# Patient Record
Sex: MALE | Race: BLACK OR AFRICAN AMERICAN | Employment: UNEMPLOYED
[De-identification: names, ages, dates, MRNs, and addresses within clinical notes are randomized per-mention and may not be internally consistent; named-entity substitution may affect disease eponyms.]

---

## 2017-01-05 ENCOUNTER — OFFICE VISIT (OUTPATIENT)
Dept: NEUROLOGY | Facility: CLINIC | Age: 27
End: 2017-01-05

## 2017-01-05 DIAGNOSIS — G90.511 COMPLEX REGIONAL PAIN SYNDROME TYPE 1 OF RIGHT UPPER EXTREMITY: Primary | ICD-10-CM

## 2017-01-05 DIAGNOSIS — M65.4 RADIAL STYLOID TENOSYNOVITIS (DE QUERVAIN): ICD-10-CM

## 2017-01-05 PROCEDURE — 95886 MUSC TEST DONE W/N TEST COMP: CPT | Performed by: PSYCHIATRY & NEUROLOGY

## 2017-01-05 PROCEDURE — 95909 NRV CNDJ TST 5-6 STUDIES: CPT | Performed by: PSYCHIATRY & NEUROLOGY

## 2017-02-01 ENCOUNTER — OFFICE VISIT (OUTPATIENT)
Dept: ORTHOPEDIC SURGERY | Facility: CLINIC | Age: 27
End: 2017-02-01

## 2017-02-01 VITALS — WEIGHT: 283 LBS | BODY MASS INDEX: 36.32 KG/M2 | HEIGHT: 74 IN

## 2017-02-01 DIAGNOSIS — M25.531 CHRONIC PAIN OF RIGHT WRIST: ICD-10-CM

## 2017-02-01 DIAGNOSIS — G89.29 CHRONIC PAIN OF RIGHT WRIST: ICD-10-CM

## 2017-02-01 DIAGNOSIS — M19.031 ARTHRITIS OF RIGHT WRIST: ICD-10-CM

## 2017-02-01 DIAGNOSIS — M65.4 DE QUERVAIN'S TENOSYNOVITIS, RIGHT: Primary | ICD-10-CM

## 2017-02-01 PROCEDURE — 99213 OFFICE O/P EST LOW 20 MIN: CPT | Performed by: ORTHOPAEDIC SURGERY

## 2017-02-01 RX ORDER — HYDROCODONE BITARTRATE AND ACETAMINOPHEN 5; 325 MG/1; MG/1
1 TABLET ORAL EVERY 12 HOURS PRN
Qty: 30 TABLET | Refills: 0 | Status: SHIPPED | OUTPATIENT
Start: 2017-02-01 | End: 2017-09-25 | Stop reason: ALTCHOICE

## 2017-02-01 ASSESSMENT — ENCOUNTER SYMPTOMS
ABDOMINAL PAIN: 0
VOMITING: 0
EYE PAIN: 0
SHORTNESS OF BREATH: 0
NAUSEA: 0
ABDOMINAL DISTENTION: 0
COUGH: 0
WHEEZING: 0
COLOR CHANGE: 0

## 2017-05-25 ENCOUNTER — HOSPITAL ENCOUNTER (EMERGENCY)
Age: 27
Discharge: HOME | End: 2017-05-25
Attending: EMERGENCY MEDICINE
Payer: MEDICARE

## 2017-05-25 VITALS
TEMPERATURE: 98.3 F | BODY MASS INDEX: 36.45 KG/M2 | HEART RATE: 60 BPM | OXYGEN SATURATION: 99 % | HEIGHT: 73 IN | RESPIRATION RATE: 16 BRPM | DIASTOLIC BLOOD PRESSURE: 84 MMHG | WEIGHT: 275 LBS | SYSTOLIC BLOOD PRESSURE: 136 MMHG

## 2017-05-25 DIAGNOSIS — H10.9 CONJUNCTIVITIS OF BOTH EYES, UNSPECIFIED CONJUNCTIVITIS TYPE: Primary | ICD-10-CM

## 2017-05-25 PROCEDURE — 6370000000 HC RX 637 (ALT 250 FOR IP): Performed by: EMERGENCY MEDICINE

## 2017-05-25 PROCEDURE — 99282 EMERGENCY DEPT VISIT SF MDM: CPT

## 2017-05-25 RX ORDER — PROPARACAINE HYDROCHLORIDE 5 MG/ML
1 SOLUTION/ DROPS OPHTHALMIC ONCE
Status: COMPLETED | OUTPATIENT
Start: 2017-05-25 | End: 2017-05-25

## 2017-05-25 RX ORDER — GENTAMICIN SULFATE 3 MG/ML
2 SOLUTION/ DROPS OPHTHALMIC 4 TIMES DAILY
Status: DISCONTINUED | OUTPATIENT
Start: 2017-05-25 | End: 2017-05-25 | Stop reason: HOSPADM

## 2017-05-25 RX ADMIN — FLUORESCEIN SODIUM 1 MG: 1 STRIP OPHTHALMIC at 01:18

## 2017-05-25 RX ADMIN — PROPARACAINE HYDROCHLORIDE 1 DROP: 5 SOLUTION/ DROPS OPHTHALMIC at 01:18

## 2017-05-25 RX ADMIN — GENTAMICIN SULFATE 2 DROP: 3 SOLUTION/ DROPS OPHTHALMIC at 01:30

## 2017-05-25 ASSESSMENT — ENCOUNTER SYMPTOMS
DIARRHEA: 0
VOMITING: 0
COUGH: 0
RHINORRHEA: 0
EYE REDNESS: 1
NAUSEA: 0
SHORTNESS OF BREATH: 0
COLOR CHANGE: 0
SORE THROAT: 0
EYE DISCHARGE: 0

## 2017-06-21 ENCOUNTER — TELEPHONE (OUTPATIENT)
Dept: ORTHOPEDIC SURGERY | Age: 27
End: 2017-06-21

## 2017-09-25 ENCOUNTER — HOSPITAL ENCOUNTER (EMERGENCY)
Age: 27
Discharge: HOME | End: 2017-09-26
Attending: EMERGENCY MEDICINE
Payer: MEDICARE

## 2017-09-25 DIAGNOSIS — E86.0 DEHYDRATION: Primary | ICD-10-CM

## 2017-09-25 DIAGNOSIS — R11.11 NON-INTRACTABLE VOMITING WITHOUT NAUSEA, UNSPECIFIED VOMITING TYPE: ICD-10-CM

## 2017-09-25 PROCEDURE — 99284 EMERGENCY DEPT VISIT MOD MDM: CPT

## 2017-09-25 RX ORDER — LORAZEPAM 1 MG/1
1 TABLET ORAL EVERY 6 HOURS PRN
Qty: 4 TABLET | Refills: 0 | Status: SHIPPED | OUTPATIENT
Start: 2017-09-25 | End: 2017-09-25 | Stop reason: CLARIF

## 2017-09-25 RX ORDER — ONDANSETRON 2 MG/ML
4 INJECTION INTRAMUSCULAR; INTRAVENOUS ONCE
Status: COMPLETED | OUTPATIENT
Start: 2017-09-26 | End: 2017-09-26

## 2017-09-25 RX ORDER — 0.9 % SODIUM CHLORIDE 0.9 %
2000 INTRAVENOUS SOLUTION INTRAVENOUS ONCE
Status: COMPLETED | OUTPATIENT
Start: 2017-09-26 | End: 2017-09-26

## 2017-09-25 ASSESSMENT — PAIN DESCRIPTION - DESCRIPTORS: DESCRIPTORS: SHARP

## 2017-09-25 ASSESSMENT — PAIN DESCRIPTION - LOCATION: LOCATION: HEAD

## 2017-09-25 ASSESSMENT — PAIN DESCRIPTION - PAIN TYPE: TYPE: ACUTE PAIN

## 2017-09-25 ASSESSMENT — PAIN SCALES - GENERAL: PAINLEVEL_OUTOF10: 8

## 2017-09-25 ASSESSMENT — PAIN DESCRIPTION - ORIENTATION: ORIENTATION: POSTERIOR

## 2017-09-25 ASSESSMENT — PAIN DESCRIPTION - ONSET: ONSET: PROGRESSIVE

## 2017-09-26 VITALS
SYSTOLIC BLOOD PRESSURE: 117 MMHG | RESPIRATION RATE: 16 BRPM | HEART RATE: 73 BPM | TEMPERATURE: 97.5 F | WEIGHT: 270 LBS | DIASTOLIC BLOOD PRESSURE: 85 MMHG | HEIGHT: 73 IN | BODY MASS INDEX: 35.78 KG/M2 | OXYGEN SATURATION: 98 %

## 2017-09-26 LAB
ABSOLUTE EOS #: 0 K/UL (ref 0–0.4)
ABSOLUTE LYMPH #: 1 K/UL (ref 1–4.8)
ABSOLUTE MONO #: 0.5 K/UL (ref 0.1–1.3)
ALBUMIN SERPL-MCNC: 4.6 G/DL (ref 3.5–5.2)
ALBUMIN/GLOBULIN RATIO: NORMAL (ref 1–2.5)
ALP BLD-CCNC: 71 U/L (ref 40–129)
ALT SERPL-CCNC: 17 U/L (ref 5–41)
ANION GAP SERPL CALCULATED.3IONS-SCNC: 15 MMOL/L (ref 9–17)
AST SERPL-CCNC: 21 U/L
BASOPHILS # BLD: 0 %
BASOPHILS ABSOLUTE: 0 K/UL (ref 0–0.2)
BILIRUB SERPL-MCNC: 0.48 MG/DL (ref 0.3–1.2)
BILIRUBIN DIRECT: 0.15 MG/DL
BILIRUBIN, INDIRECT: 0.33 MG/DL (ref 0–1)
BUN BLDV-MCNC: 13 MG/DL (ref 6–20)
BUN/CREAT BLD: ABNORMAL (ref 9–20)
CALCIUM SERPL-MCNC: 9.6 MG/DL (ref 8.6–10.4)
CHLORIDE BLD-SCNC: 101 MMOL/L (ref 98–107)
CO2: 27 MMOL/L (ref 20–31)
CREAT SERPL-MCNC: 0.98 MG/DL (ref 0.7–1.2)
DIFFERENTIAL TYPE: ABNORMAL
EOSINOPHILS RELATIVE PERCENT: 0 %
GFR AFRICAN AMERICAN: >60 ML/MIN
GFR NON-AFRICAN AMERICAN: >60 ML/MIN
GFR SERPL CREATININE-BSD FRML MDRD: ABNORMAL ML/MIN/{1.73_M2}
GFR SERPL CREATININE-BSD FRML MDRD: ABNORMAL ML/MIN/{1.73_M2}
GLOBULIN: NORMAL G/DL (ref 1.5–3.8)
GLUCOSE BLD-MCNC: 133 MG/DL (ref 70–99)
HCT VFR BLD CALC: 46.2 % (ref 41–53)
HEMOGLOBIN: 15.7 G/DL (ref 13.5–17.5)
LIPASE: 15 U/L (ref 13–60)
LYMPHOCYTES # BLD: 9 %
MAGNESIUM: 2.2 MG/DL (ref 1.6–2.6)
MCH RBC QN AUTO: 30.6 PG (ref 26–34)
MCHC RBC AUTO-ENTMCNC: 33.9 G/DL (ref 31–37)
MCV RBC AUTO: 90.3 FL (ref 80–100)
MONOCYTES # BLD: 5 %
PDW BLD-RTO: 13.4 % (ref 11.5–14.9)
PLATELET # BLD: 224 K/UL (ref 150–450)
PLATELET ESTIMATE: ABNORMAL
PMV BLD AUTO: 9.2 FL (ref 6–12)
POTASSIUM SERPL-SCNC: 4.2 MMOL/L (ref 3.7–5.3)
RBC # BLD: 5.11 M/UL (ref 4.5–5.9)
RBC # BLD: ABNORMAL 10*6/UL
SEG NEUTROPHILS: 86 %
SEGMENTED NEUTROPHILS ABSOLUTE COUNT: 10 K/UL (ref 1.3–9.1)
SODIUM BLD-SCNC: 143 MMOL/L (ref 135–144)
TOTAL PROTEIN: 7.9 G/DL (ref 6.4–8.3)
WBC # BLD: 11.6 K/UL (ref 3.5–11)
WBC # BLD: ABNORMAL 10*3/UL

## 2017-09-26 PROCEDURE — 80076 HEPATIC FUNCTION PANEL: CPT

## 2017-09-26 PROCEDURE — 83735 ASSAY OF MAGNESIUM: CPT

## 2017-09-26 PROCEDURE — 96374 THER/PROPH/DIAG INJ IV PUSH: CPT

## 2017-09-26 PROCEDURE — 85025 COMPLETE CBC W/AUTO DIFF WBC: CPT

## 2017-09-26 PROCEDURE — 93005 ELECTROCARDIOGRAM TRACING: CPT

## 2017-09-26 PROCEDURE — 2580000003 HC RX 258: Performed by: EMERGENCY MEDICINE

## 2017-09-26 PROCEDURE — 96361 HYDRATE IV INFUSION ADD-ON: CPT

## 2017-09-26 PROCEDURE — 36415 COLL VENOUS BLD VENIPUNCTURE: CPT

## 2017-09-26 PROCEDURE — 83690 ASSAY OF LIPASE: CPT

## 2017-09-26 PROCEDURE — 6360000002 HC RX W HCPCS: Performed by: EMERGENCY MEDICINE

## 2017-09-26 PROCEDURE — 80048 BASIC METABOLIC PNL TOTAL CA: CPT

## 2017-09-26 RX ADMIN — ONDANSETRON 4 MG: 2 INJECTION INTRAMUSCULAR; INTRAVENOUS at 00:07

## 2017-09-26 RX ADMIN — SODIUM CHLORIDE 2000 ML: 9 INJECTION, SOLUTION INTRAVENOUS at 00:07

## 2017-09-26 ASSESSMENT — ENCOUNTER SYMPTOMS
VOMITING: 1
NAUSEA: 1
ABDOMINAL PAIN: 1
SHORTNESS OF BREATH: 0
CHEST TIGHTNESS: 0
BACK PAIN: 0

## 2017-09-29 LAB
EKG ATRIAL RATE: 72 BPM
EKG P AXIS: 67 DEGREES
EKG P-R INTERVAL: 172 MS
EKG Q-T INTERVAL: 380 MS
EKG QRS DURATION: 98 MS
EKG QTC CALCULATION (BAZETT): 416 MS
EKG R AXIS: 73 DEGREES
EKG T AXIS: 33 DEGREES
EKG VENTRICULAR RATE: 72 BPM

## 2017-12-05 ENCOUNTER — OFFICE VISIT (OUTPATIENT)
Dept: INTERNAL MEDICINE | Age: 27
End: 2017-12-05
Payer: MEDICARE

## 2017-12-05 VITALS
HEART RATE: 74 BPM | HEIGHT: 74 IN | SYSTOLIC BLOOD PRESSURE: 146 MMHG | WEIGHT: 297 LBS | BODY MASS INDEX: 38.12 KG/M2 | DIASTOLIC BLOOD PRESSURE: 86 MMHG

## 2017-12-05 DIAGNOSIS — M25.531 RIGHT WRIST PAIN: Primary | ICD-10-CM

## 2017-12-05 DIAGNOSIS — Z23 NEED FOR VACCINATION FOR PNEUMOCOCCUS: ICD-10-CM

## 2017-12-05 DIAGNOSIS — Z23 NEEDS FLU SHOT: ICD-10-CM

## 2017-12-05 DIAGNOSIS — F17.200 SMOKER: ICD-10-CM

## 2017-12-05 DIAGNOSIS — Z23 NEED FOR TDAP VACCINATION: ICD-10-CM

## 2017-12-05 DIAGNOSIS — Z11.4 SCREENING FOR HIV (HUMAN IMMUNODEFICIENCY VIRUS): ICD-10-CM

## 2017-12-05 PROCEDURE — 90688 IIV4 VACCINE SPLT 0.5 ML IM: CPT | Performed by: INTERNAL MEDICINE

## 2017-12-05 PROCEDURE — G8417 CALC BMI ABV UP PARAM F/U: HCPCS | Performed by: INTERNAL MEDICINE

## 2017-12-05 PROCEDURE — 90471 IMMUNIZATION ADMIN: CPT | Performed by: INTERNAL MEDICINE

## 2017-12-05 PROCEDURE — 4004F PT TOBACCO SCREEN RCVD TLK: CPT | Performed by: INTERNAL MEDICINE

## 2017-12-05 PROCEDURE — 90732 PPSV23 VACC 2 YRS+ SUBQ/IM: CPT | Performed by: INTERNAL MEDICINE

## 2017-12-05 PROCEDURE — 99203 OFFICE O/P NEW LOW 30 MIN: CPT | Performed by: INTERNAL MEDICINE

## 2017-12-05 PROCEDURE — G8427 DOCREV CUR MEDS BY ELIG CLIN: HCPCS | Performed by: INTERNAL MEDICINE

## 2017-12-05 PROCEDURE — G8484 FLU IMMUNIZE NO ADMIN: HCPCS | Performed by: INTERNAL MEDICINE

## 2017-12-05 PROCEDURE — 90715 TDAP VACCINE 7 YRS/> IM: CPT | Performed by: INTERNAL MEDICINE

## 2017-12-05 PROCEDURE — 90472 IMMUNIZATION ADMIN EACH ADD: CPT | Performed by: INTERNAL MEDICINE

## 2017-12-05 RX ORDER — GABAPENTIN 100 MG/1
100 CAPSULE ORAL DAILY
Qty: 90 CAPSULE | Refills: 3 | Status: SHIPPED | OUTPATIENT
Start: 2017-12-05 | End: 2018-03-15

## 2017-12-05 RX ORDER — IBUPROFEN 800 MG/1
800 TABLET ORAL EVERY 8 HOURS PRN
Qty: 60 TABLET | Refills: 2 | Status: SHIPPED | OUTPATIENT
Start: 2017-12-05 | End: 2018-01-01

## 2017-12-05 ASSESSMENT — PATIENT HEALTH QUESTIONNAIRE - PHQ9
SUM OF ALL RESPONSES TO PHQ QUESTIONS 1-9: 6
8. MOVING OR SPEAKING SO SLOWLY THAT OTHER PEOPLE COULD HAVE NOTICED. OR THE OPPOSITE, BEING SO FIGETY OR RESTLESS THAT YOU HAVE BEEN MOVING AROUND A LOT MORE THAN USUAL: 0
SUM OF ALL RESPONSES TO PHQ9 QUESTIONS 1 & 2: 2
2. FEELING DOWN, DEPRESSED OR HOPELESS: 0
5. POOR APPETITE OR OVEREATING: 3
7. TROUBLE CONCENTRATING ON THINGS, SUCH AS READING THE NEWSPAPER OR WATCHING TELEVISION: 0
1. LITTLE INTEREST OR PLEASURE IN DOING THINGS: 2
3. TROUBLE FALLING OR STAYING ASLEEP: 1
9. THOUGHTS THAT YOU WOULD BE BETTER OFF DEAD, OR OF HURTING YOURSELF: 0
10. IF YOU CHECKED OFF ANY PROBLEMS, HOW DIFFICULT HAVE THESE PROBLEMS MADE IT FOR YOU TO DO YOUR WORK, TAKE CARE OF THINGS AT HOME, OR GET ALONG WITH OTHER PEOPLE: 1
4. FEELING TIRED OR HAVING LITTLE ENERGY: 0
6. FEELING BAD ABOUT YOURSELF - OR THAT YOU ARE A FAILURE OR HAVE LET YOURSELF OR YOUR FAMILY DOWN: 0

## 2017-12-05 NOTE — PROGRESS NOTES
pneumococcus    4. Need for Tdap vaccination    5. Screening for HIV (human immunodeficiency virus)    6. Smoker         Plan:   1. Will prescribe Ibuprofen PRN, topical volateren. Also start gabapentin  Continue using splint  Will refer for Physical therapy  Will obtain office notes from orthopedics at [facility]  2. Flu shot  3. Pneumovax  4. TDAP  5. HIV antibody  6. Counseling done for smoking cessation-he is not ready to quit yet  7. Return in about 2 months (around 2/5/2018). '

## 2017-12-12 ENCOUNTER — TELEPHONE (OUTPATIENT)
Dept: INTERNAL MEDICINE | Age: 27
End: 2017-12-12

## 2017-12-12 DIAGNOSIS — M25.531 RIGHT WRIST PAIN: Primary | ICD-10-CM

## 2017-12-12 NOTE — TELEPHONE ENCOUNTER
Occupational Therapist -----Needs order changed from PT to OT for Right wrist if appropriate  She will get the orders from [facility] system once signed-- pended in EPIC

## 2018-01-01 ENCOUNTER — APPOINTMENT (OUTPATIENT)
Dept: GENERAL RADIOLOGY | Age: 28
End: 2018-01-01
Payer: MEDICARE

## 2018-01-01 ENCOUNTER — HOSPITAL ENCOUNTER (EMERGENCY)
Age: 28
Discharge: HOME | End: 2018-01-01
Attending: EMERGENCY MEDICINE
Payer: MEDICARE

## 2018-01-01 VITALS
OXYGEN SATURATION: 98 % | HEART RATE: 72 BPM | TEMPERATURE: 98.2 F | RESPIRATION RATE: 18 BRPM | SYSTOLIC BLOOD PRESSURE: 142 MMHG | HEIGHT: 73 IN | WEIGHT: 297 LBS | DIASTOLIC BLOOD PRESSURE: 76 MMHG | BODY MASS INDEX: 39.36 KG/M2

## 2018-01-01 DIAGNOSIS — S60.221A CONTUSION OF RIGHT HAND, INITIAL ENCOUNTER: Primary | ICD-10-CM

## 2018-01-01 PROCEDURE — 6370000000 HC RX 637 (ALT 250 FOR IP): Performed by: STUDENT IN AN ORGANIZED HEALTH CARE EDUCATION/TRAINING PROGRAM

## 2018-01-01 PROCEDURE — 73130 X-RAY EXAM OF HAND: CPT

## 2018-01-01 PROCEDURE — 99283 EMERGENCY DEPT VISIT LOW MDM: CPT

## 2018-01-01 RX ORDER — IBUPROFEN 400 MG/1
400 TABLET ORAL EVERY 6 HOURS PRN
Qty: 15 TABLET | Refills: 0 | Status: SHIPPED | OUTPATIENT
Start: 2018-01-01 | End: 2022-10-11

## 2018-01-01 RX ORDER — IBUPROFEN 800 MG/1
800 TABLET ORAL ONCE
Status: COMPLETED | OUTPATIENT
Start: 2018-01-01 | End: 2018-01-01

## 2018-01-01 RX ADMIN — IBUPROFEN 800 MG: 800 TABLET, FILM COATED ORAL at 15:41

## 2018-01-01 ASSESSMENT — PAIN SCALES - GENERAL
PAINLEVEL_OUTOF10: 10
PAINLEVEL_OUTOF10: 10

## 2018-01-01 ASSESSMENT — ENCOUNTER SYMPTOMS
PHOTOPHOBIA: 0
NAUSEA: 0
COUGH: 0
VOMITING: 0
SORE THROAT: 0
CHEST TIGHTNESS: 0
SHORTNESS OF BREATH: 0
BACK PAIN: 0
TROUBLE SWALLOWING: 0
ABDOMINAL PAIN: 0
WHEEZING: 0

## 2018-01-01 ASSESSMENT — PAIN DESCRIPTION - PAIN TYPE: TYPE: ACUTE PAIN

## 2018-01-01 ASSESSMENT — PAIN DESCRIPTION - LOCATION: LOCATION: HAND

## 2018-01-01 ASSESSMENT — PAIN DESCRIPTION - DESCRIPTORS: DESCRIPTORS: ACHING

## 2018-01-01 ASSESSMENT — PAIN DESCRIPTION - ORIENTATION: ORIENTATION: RIGHT

## 2018-01-01 ASSESSMENT — PAIN DESCRIPTION - FREQUENCY: FREQUENCY: CONTINUOUS

## 2018-01-01 NOTE — ED NOTES
Pt back from [address] [place] [address] X 600 [facility], RN  01/01/18 3344

## 2018-01-01 NOTE — ED NOTES
Temporary report received from [facility] [address] [place] [address].       Hermelinda Serrato RN  01/01/18 2508

## 2018-01-01 NOTE — ED PROVIDER NOTES
[facility]  ED  Emergency Department Encounter  Emergency Medicine Resident     Pt Name: Dhaval Lion  MRN: [id number]  [place] 1990  Date of evaluation: 1/1/18  PCP:  Sanjay Gayle MD    [address] [place] [address]       Chief Complaint   Patient presents with    Hand Injury       HISTORY OF PRESENT ILLNESS  (Location/Symptom, Timing/Onset, Context/Setting, Quality, Duration, Modifying Factors, Severity.)      Dhaval Whitneyr is a 32 y.o. male who presents with Right hand injury. Patient states that he was shoveling snow yesterday around noon and fell out onto his hand. Patient complaining of fourth and fifth digit pain. He is also spirits difficulty with putting on clothes due to the pain. He has no obvious deformity to the area. He denies any open wounds or bleeding to the area. Patient is able to use his hand but the range of motion is limited due to pain. He has a previous injury and multiple surgeries to his fourth and fifth digit and is worried that he might have fractured his hand again. His tetanus shot is within 10 years. Dominant hand his right hand. PAST MEDICAL / SURGICAL / SOCIAL / FAMILY HISTORY      has a past medical history of Dental bridge present; Difficult intravenous access; Hand fracture, right; PONV (postoperative nausea and vomiting); and Wears glasses. has a past surgical history that includes fracture surgery (Right, 06/30/2015); orthopedic surgery (Right, 12/3/15); and Wrist surgery (Right, 09/27/2016). Social History     Social History    Marital status: Single     Spouse name: N/A    Number of children: N/A    Years of education: N/A     Occupational History    Not on file.      Social History Main Topics    Smoking status: Current Every Day Smoker     Packs/day: 2.00     Years: 3.00     Types: Cigarettes    Smokeless tobacco: Never Used    Alcohol use No      Comment: LAST USE 01/2016    Drug use: Yes     Types: Marijuana      Comment: LAST USE FIRST WEEK OF [place]    Sexual activity: Not on file     Other Topics Concern    Not on file     Social History Narrative    No narrative on file       Family History   Problem Relation Age of Onset    Other Father      SICKLE CELL AMENIA    Asthma Brother     Mental Illness Paternal Grandmother      SCHIZOPHRENIA       Allergies:  Review of patient's allergies indicates no known allergies. Home Medications:  Prior to Admission medications    Medication Sig Start Date End Date Taking? Authorizing Provider   ibuprofen (ADVIL;MOTRIN) 400 MG tablet Take 1 tablet by mouth every 6 hours as needed for Pain 1/1/18  Yes Mariam Johns MD   gabapentin (NEURONTIN) 100 MG capsule Take 1 capsule by mouth daily 12/5/17   Apple Neri MD   diclofenac sodium 1 % GEL Apply 2 g topically 2 times daily 12/5/17   Apple Neri MD       REVIEW OF SYSTEMS    (2-9 systems for level 4, 10 or more for level 5)      Review of Systems   Constitutional: Negative for chills and fever. HENT: Negative for sore throat and trouble swallowing. Eyes: Negative for photophobia. Respiratory: Negative for cough, chest tightness, shortness of breath and wheezing. Cardiovascular: Negative for chest pain and palpitations. Gastrointestinal: Negative for abdominal pain, nausea and vomiting. Endocrine: Negative for polyuria. Genitourinary: Negative for dysuria and flank pain. Musculoskeletal: Negative for back pain and neck pain. Right hand pain, swelling   Skin: Negative for rash and wound. Neurological: Negative for syncope, weakness, light-headedness and headaches. Psychiatric/Behavioral: Negative for agitation and confusion.        PHYSICAL EXAM   (up to 7 for level 4, 8 or more for level 5)      INITIAL VITALS:   BP (!) 142/76   Pulse 72   Temp 98.2 °F (36.8 °C) (Oral)   Resp 18   Ht 6' 1\" (1.854 m)   Wt 297 lb (134.7 kg)   SpO2 98%   BMI 39.18 kg/m²     Physical Exam   Constitutional: He

## 2018-02-01 ENCOUNTER — HOSPITAL ENCOUNTER (EMERGENCY)
Age: 28
Discharge: HOME | End: 2018-02-01
Attending: EMERGENCY MEDICINE
Payer: MEDICARE

## 2018-02-01 VITALS
DIASTOLIC BLOOD PRESSURE: 93 MMHG | SYSTOLIC BLOOD PRESSURE: 153 MMHG | TEMPERATURE: 97.7 F | RESPIRATION RATE: 16 BRPM | HEART RATE: 90 BPM | OXYGEN SATURATION: 97 %

## 2018-02-01 DIAGNOSIS — J02.9 ACUTE PHARYNGITIS, UNSPECIFIED ETIOLOGY: Primary | ICD-10-CM

## 2018-02-01 LAB — MONONUCLEOSIS SCREEN: NEGATIVE

## 2018-02-01 PROCEDURE — 6360000002 HC RX W HCPCS: Performed by: EMERGENCY MEDICINE

## 2018-02-01 PROCEDURE — 6370000000 HC RX 637 (ALT 250 FOR IP): Performed by: EMERGENCY MEDICINE

## 2018-02-01 PROCEDURE — 86308 HETEROPHILE ANTIBODY SCREEN: CPT

## 2018-02-01 PROCEDURE — 99282 EMERGENCY DEPT VISIT SF MDM: CPT

## 2018-02-01 RX ORDER — DEXAMETHASONE SODIUM PHOSPHATE 10 MG/ML
10 INJECTION INTRAMUSCULAR; INTRAVENOUS ONCE
Status: COMPLETED | OUTPATIENT
Start: 2018-02-01 | End: 2018-02-01

## 2018-02-01 RX ORDER — AMOXICILLIN AND CLAVULANATE POTASSIUM 875; 125 MG/1; MG/1
1 TABLET, FILM COATED ORAL ONCE
Status: COMPLETED | OUTPATIENT
Start: 2018-02-01 | End: 2018-02-01

## 2018-02-01 RX ORDER — AMOXICILLIN 250 MG/1
500 CAPSULE ORAL ONCE
Status: DISCONTINUED | OUTPATIENT
Start: 2018-02-01 | End: 2018-02-01

## 2018-02-01 RX ORDER — DEXAMETHASONE 4 MG/1
8 TABLET ORAL ONCE
Qty: 2 TABLET | Refills: 0 | Status: SHIPPED | OUTPATIENT
Start: 2018-02-01 | End: 2018-02-01

## 2018-02-01 RX ORDER — AMOXICILLIN AND CLAVULANATE POTASSIUM 875; 125 MG/1; MG/1
1 TABLET, FILM COATED ORAL 2 TIMES DAILY
Qty: 20 TABLET | Refills: 0 | Status: SHIPPED | OUTPATIENT
Start: 2018-02-01 | End: 2018-02-11

## 2018-02-01 RX ADMIN — AMOXICILLIN AND CLAVULANATE POTASSIUM 1 TABLET: 875; 125 TABLET, FILM COATED ORAL at 11:43

## 2018-02-01 RX ADMIN — DEXAMETHASONE SODIUM PHOSPHATE 10 MG: 10 INJECTION INTRAMUSCULAR; INTRAVENOUS at 11:43

## 2018-02-01 ASSESSMENT — ENCOUNTER SYMPTOMS
COUGH: 0
SORE THROAT: 1
EYE DISCHARGE: 0
NAUSEA: 0
COLOR CHANGE: 0
VOMITING: 0
EYE REDNESS: 0
SHORTNESS OF BREATH: 0
WHEEZING: 0

## 2018-02-01 ASSESSMENT — PAIN SCALES - GENERAL: PAINLEVEL_OUTOF10: 10

## 2018-02-01 ASSESSMENT — PAIN DESCRIPTION - LOCATION: LOCATION: THROAT

## 2018-02-01 ASSESSMENT — PAIN DESCRIPTION - PAIN TYPE: TYPE: ACUTE PAIN

## 2018-02-01 ASSESSMENT — PAIN DESCRIPTION - DESCRIPTORS: DESCRIPTORS: BURNING

## 2018-02-01 NOTE — ED PROVIDER NOTES
[address] [facility]     Emergency Department     Faculty Attestation    I performed a history and physical examination of the patient and discussed management with the resident. I reviewed the residents note and agree with the documented findings and plan of care. Any areas of disagreement are noted on the chart. I was personally present for the key portions of any procedures. I have documented in the chart those procedures where I was not present during the key portions. I have reviewed the emergency nurses triage note. I agree with the chief complaint, past medical history, past surgical history, allergies, medications, social and family history as documented unless otherwise noted below. For Physician Assistant/ Nurse Practitioner cases/documentation I have personally evaluated this patient and have completed at least one if not all key elements of the E/M (history, physical exam, and MDM). Additional findings are as noted.       Primary Care Physician:  Sanjay Gayle MD    CHIEF COMPLAINT       Chief Complaint   Patient presents with    Pharyngitis       RECENT VITALS:   Temp: 97.7 °F (36.5 °C),  Pulse: 90, Resp: 16, BP: (!) 153/93    LABS:  Labs Reviewed - No data to display      PERTINENT ATTENDING PHYSICIAN COMMENTS:    Patient here with a sore throat had weak symptoms of cold like symptoms and then developed sore throat on exam his tonsils are touching his uvula but they're symmetric he does have exudates does have a lot of tenderness anteriorly and some posterior lymphadenopathy     Critical Care          Barry Mcdaniel MD, [facility]  Attending Emergency  Physician              Barry Mcdaniel MD  02/01/18 6932

## 2018-02-01 NOTE — ED PROVIDER NOTES
[facility]  ED  Emergency Department Encounter  Emergency Medicine Resident     Pt Name: iKrk Grossman  MRN: [id number]  [place] 1990  Date of evaluation: 2/1/18  PCP:  Mariann Minaya MD    [address] [place] [address]       Chief Complaint   Patient presents with    Pharyngitis       HISTORY OF PRESENT ILLNESS  (Location/Symptom, Timing/Onset, Context/Setting, Quality, Duration, Modifying Factors, Severity.)      Kirk Grossman is a 32 y.o. male who presents Complaining of sore throat. Complains of flulike symptoms for about a week but sore throat worsening over the last 2 days. No rashes, chest pain, drooling, shortness of breath, cough. Reports fever and chills which resolved    PAST MEDICAL / SURGICAL / SOCIAL / FAMILY HISTORY      has a past medical history of Dental bridge present; Difficult intravenous access; Hand fracture, right; PONV (postoperative nausea and vomiting); and Wears glasses. has a past surgical history that includes fracture surgery (Right, 06/30/2015); orthopedic surgery (Right, 12/3/15); and Wrist surgery (Right, 09/27/2016). Social History     Social History    Marital status: Single     Spouse name: N/A    Number of children: N/A    Years of education: N/A     Occupational History    Not on file.      Social History Main Topics    Smoking status: Current Every Day Smoker     Packs/day: 2.00     Years: 3.00     Types: Cigarettes    Smokeless tobacco: Never Used    Alcohol use No      Comment: LAST USE 01/2016    Drug use: Yes     Types: Marijuana      Comment: LAST USE FIRST WEEK OF AUGUST    Sexual activity: Not on file     Other Topics Concern    Not on file     Social History Narrative    No narrative on file         Family History   Problem Relation Age of Onset    Other Father      SICKLE CELL AMENIA    Asthma Brother     Mental Illness Paternal Grandmother      SCHIZOPHRENIA         Allergies:  Review of patient's allergies indicates no rule out    DIAGNOSTIC RESULTS / EMERGENCY DEPARTMENT COURSE / MDM     LABS:  Labs Reviewed   MONONUCLEOSIS SCREEN     If the patient was admitted, some of the above labs may have been ordered by the admitting team(s) and were auto populated above when I refreshed my note prior to signing it. If there is any question, please check for the responsible provider for individual orders in the EHR system. Additionally, some of the above labs results may still be pending. RADIOLOGY:  All forms of imaging other than ED bedside ultrasounds are viewed and interpreted by a radiologist and their interpretations are displayed below. None     EMERGENCY DEPARTMENT COURSE AND MEDICAL DECISION MAKING:  ED Course      63-year-old male with significant edema, exudate, mono screen negative. Coverage for streptococcal pharyngitis and we'll give Decadron now and write prescription for another dose of 24-48 hrs. if his symptoms persist.  I encouraged him to return back if he should develop drooling, inability to swallow and handle his secretions, shortness of breath or difficulty breathing. I encouraged him to take his medications as prescribed and to follow-up with his primary care physician at the next available appointment and to return back here if he has any of the artery discussed symptoms or if his current symptoms worsen or he has any further concerns. He voices understanding and is agreeable with the current treatment plan    PROCEDURES:  None     CONSULTS:  None  If the patient was admitted, some of the above consults may have been placed by the admitting team(s) and were auto populated above when I refreshed my note prior to signing it. If there is any question, please check for the responsible provider for individual orders in the EHR system. CRITICAL CARE:  None     FINAL IMPRESSION      1.  Acute pharyngitis, unspecified etiology             DISPOSITION / PLAN     DISPOSITION Decision To Discharge 02/01/2018 12:11:09 PM       If discharged, the patient was instructed to return to the emergency department with any worsening symptoms, if new symptoms arise, or if they have any other concerns. Patient was instructed not to drive home if discharged today and received pain medications or other mind-altering medications while here. Pre-hypertension/Hypertension: In the case that there was an elevated blood pressure reading for this patient today in the emergency department, the patient was informed that he or she may have pre-hypertension or hypertension. It was recommended to the patient to call the primary care provider listed in the discharge instructions or a physician of the patient's choice this week to arrange follow up for further evaluation of possible pre-hypertension or hypertension. PATIENT REFERRED TO:  Gisel Balderas MD  [address] [place] [address] [address] [phone]    Schedule an appointment as soon as possible for a visit         DISCHARGE MEDICATIONS:  Discharge Medication List as of 2/1/2018 12:12 PM      START taking these medications    Details   amoxicillin-clavulanate (AUGMENTIN) 875-125 MG per tablet Take 1 tablet by mouth 2 times daily for 10 days, Disp-20 tablet, R-0Print      dexamethasone (DECADRON) 4 MG tablet Take 2 tablets by mouth once for 1 dose Take in 24-48hrs if needed for throat swelling, Disp-2 tablet, R-0Print             Daniel PINEDA   Emergency Medicine Resident Physician    (Please note that portions of this note were completed with a voice recognition program.  Efforts were made to edit the dictations but occasionally words are mis-transcribed.)     Daniel Raymundo DO  Resident  02/01/18 7303

## 2018-02-13 ENCOUNTER — OFFICE VISIT (OUTPATIENT)
Dept: INTERNAL MEDICINE | Age: 28
End: 2018-02-13
Payer: MEDICARE

## 2018-02-13 VITALS
BODY MASS INDEX: 39.01 KG/M2 | SYSTOLIC BLOOD PRESSURE: 144 MMHG | WEIGHT: 295.7 LBS | HEART RATE: 88 BPM | DIASTOLIC BLOOD PRESSURE: 102 MMHG

## 2018-02-13 DIAGNOSIS — G89.29 CHRONIC PAIN OF RIGHT WRIST: Primary | ICD-10-CM

## 2018-02-13 DIAGNOSIS — M25.531 CHRONIC PAIN OF RIGHT WRIST: Primary | ICD-10-CM

## 2018-02-13 PROCEDURE — G8484 FLU IMMUNIZE NO ADMIN: HCPCS | Performed by: INTERNAL MEDICINE

## 2018-02-13 PROCEDURE — G8427 DOCREV CUR MEDS BY ELIG CLIN: HCPCS | Performed by: INTERNAL MEDICINE

## 2018-02-13 PROCEDURE — 99213 OFFICE O/P EST LOW 20 MIN: CPT | Performed by: INTERNAL MEDICINE

## 2018-02-13 PROCEDURE — G8417 CALC BMI ABV UP PARAM F/U: HCPCS | Performed by: INTERNAL MEDICINE

## 2018-02-13 PROCEDURE — 4004F PT TOBACCO SCREEN RCVD TLK: CPT | Performed by: INTERNAL MEDICINE

## 2018-02-13 RX ORDER — DEXAMETHASONE 4 MG/1
TABLET ORAL
Refills: 0 | COMMUNITY
Start: 2018-02-03 | End: 2019-04-30 | Stop reason: ALTCHOICE

## 2018-02-13 RX ORDER — PREGABALIN 25 MG/1
25 CAPSULE ORAL 2 TIMES DAILY
Qty: 60 CAPSULE | Refills: 3 | Status: SHIPPED | OUTPATIENT
Start: 2018-02-13 | End: 2018-03-15

## 2018-02-14 NOTE — PROGRESS NOTES
has been ordered in the past  3. Counseling done for smoking cessation-he is not ready to quit yet  4. Return in about 3 months (around 5/13/2018). '

## 2018-02-15 ENCOUNTER — TELEPHONE (OUTPATIENT)
Dept: INTERNAL MEDICINE | Age: 28
End: 2018-02-15

## 2018-02-15 NOTE — TELEPHONE ENCOUNTER
PA request for Lyrica.  PA started to insurance, additional information needed but questions have yet to populate, waiting to finish PA and submit

## 2018-02-16 ENCOUNTER — TELEPHONE (OUTPATIENT)
Dept: INTERNAL MEDICINE | Age: 28
End: 2018-02-16

## 2018-02-16 NOTE — TELEPHONE ENCOUNTER
PC from pt stating he was seen on 2/13/18 and was given a script for Lyrica. Pt states that his insurance will not cover it at all, not even with a prior auth. Pt states that it will cost him $400.00 and he can't afford it. Pt wants to know if there is something else he can in place of it.       Health Maintenance   Topic Date Due    HIV screen  12/26/2005    DTaP/Tdap/Td vaccine (2 - Td) 12/05/2027    Flu vaccine  Completed    Pneumococcal med risk  Completed             (applicable per patient's age: Cancer Screenings, Depression Screening, Fall Risk Screening, Immunizations)    AST (U/L)   Date Value   09/26/2017 21     ALT (U/L)   Date Value   09/26/2017 17     BUN (mg/dL)   Date Value   09/26/2017 13      (goal A1C is < 7)   (goal LDL is <100) need 30-50% reduction from baseline     BP Readings from Last 3 Encounters:   02/13/18 (!) 144/102   02/01/18 (!) 153/93   01/01/18 (!) 142/76    (goal /80)      All Future Testing planned in [facility]:  Lab Frequency Next Occurrence   HIV Screen Once 03/05/2018   OT eval and treat Once 01/13/2018       Next Visit Date:  Future Appointments  Date Time Provider Jordan Mirza   5/21/2018 8:30 AM Paulino Eden MD [facility]            Patient Active Problem List:     Fracture of metacarpal bone     Radial styloid tenosynovitis of right hand     Radial styloid tenosynovitis     Complex regional pain syndrome type 1 of right upper extremity     De Quervain's tenosynovitis, right     Chronic pain of right wrist

## 2018-03-13 ENCOUNTER — TELEPHONE (OUTPATIENT)
Dept: INTERNAL MEDICINE | Age: 28
End: 2018-03-13

## 2018-03-13 NOTE — LETTER
[address]  [address] [facility] [address] [facility]  Phone: [phone]  Fax: [phone]    Kamilah Bay MD        March 13, 2018    [address] [id number] [facility] [address]      Dear Vivienne Craven: We are sending this letter because your PCP ordered [facility] for you to have done at your last visit here and they have not yet been completed. If you can please come to our office on the 2nd floor to  your orders to have them compelted. If you do not have a follow-up appointment scheduled you can either contact the office to make an appointment with us or you can make one when you come in to pick-up your orders. If you have any questions or concerns, please don't hesitate to call.     Sincerely,        Kamilah Bay MD

## 2018-03-15 ENCOUNTER — HOSPITAL ENCOUNTER (OUTPATIENT)
Age: 28
Setting detail: SPECIMEN
Discharge: HOME | End: 2018-03-15
Payer: MEDICARE

## 2018-03-15 ENCOUNTER — OFFICE VISIT (OUTPATIENT)
Dept: INTERNAL MEDICINE | Age: 28
End: 2018-03-15
Payer: MEDICARE

## 2018-03-15 VITALS
SYSTOLIC BLOOD PRESSURE: 148 MMHG | WEIGHT: 313 LBS | DIASTOLIC BLOOD PRESSURE: 92 MMHG | BODY MASS INDEX: 41.48 KG/M2 | HEIGHT: 73 IN | HEART RATE: 82 BPM

## 2018-03-15 DIAGNOSIS — M25.531 CHRONIC PAIN OF RIGHT WRIST: ICD-10-CM

## 2018-03-15 DIAGNOSIS — E66.01 MORBID OBESITY WITH BMI OF 40.0-44.9, ADULT (HCC): ICD-10-CM

## 2018-03-15 DIAGNOSIS — R73.9 HYPERGLYCEMIA: ICD-10-CM

## 2018-03-15 DIAGNOSIS — R74.8 ELEVATED LIVER ENZYMES: Primary | ICD-10-CM

## 2018-03-15 DIAGNOSIS — I10 ESSENTIAL HYPERTENSION: ICD-10-CM

## 2018-03-15 DIAGNOSIS — R74.8 ELEVATED LIVER ENZYMES: ICD-10-CM

## 2018-03-15 DIAGNOSIS — G89.29 CHRONIC PAIN OF RIGHT WRIST: ICD-10-CM

## 2018-03-15 LAB
CHOLESTEROL/HDL RATIO: 6.4
CHOLESTEROL: 206 MG/DL
ESTIMATED AVERAGE GLUCOSE: 131 MG/DL
FERRITIN: 81 UG/L (ref 30–400)
HAV IGM SER IA-ACNC: NONREACTIVE
HBA1C MFR BLD: 6.2 % (ref 4–6)
HDLC SERPL-MCNC: 32 MG/DL
HEPATITIS B CORE IGM ANTIBODY: NONREACTIVE
HEPATITIS B SURFACE ANTIGEN: NONREACTIVE
HEPATITIS C ANTIBODY: NONREACTIVE
LDL CHOLESTEROL: 127 MG/DL (ref 0–130)
TRIGL SERPL-MCNC: 233 MG/DL
VLDLC SERPL CALC-MCNC: ABNORMAL MG/DL (ref 1–30)

## 2018-03-15 PROCEDURE — G8427 DOCREV CUR MEDS BY ELIG CLIN: HCPCS | Performed by: HOSPITALIST

## 2018-03-15 PROCEDURE — 80074 ACUTE HEPATITIS PANEL: CPT

## 2018-03-15 PROCEDURE — 99213 OFFICE O/P EST LOW 20 MIN: CPT | Performed by: HOSPITALIST

## 2018-03-15 PROCEDURE — 4004F PT TOBACCO SCREEN RCVD TLK: CPT | Performed by: HOSPITALIST

## 2018-03-15 PROCEDURE — 82728 ASSAY OF FERRITIN: CPT

## 2018-03-15 PROCEDURE — G8417 CALC BMI ABV UP PARAM F/U: HCPCS | Performed by: HOSPITALIST

## 2018-03-15 PROCEDURE — 83036 HEMOGLOBIN GLYCOSYLATED A1C: CPT

## 2018-03-15 PROCEDURE — 36415 COLL VENOUS BLD VENIPUNCTURE: CPT

## 2018-03-15 PROCEDURE — 80061 LIPID PANEL: CPT

## 2018-03-15 PROCEDURE — G8482 FLU IMMUNIZE ORDER/ADMIN: HCPCS | Performed by: HOSPITALIST

## 2018-03-15 PROCEDURE — 99213 OFFICE O/P EST LOW 20 MIN: CPT

## 2018-03-15 RX ORDER — AMLODIPINE BESYLATE 5 MG/1
5 TABLET ORAL DAILY
Qty: 30 TABLET | Refills: 3 | Status: SHIPPED | OUTPATIENT
Start: 2018-03-15

## 2018-03-15 NOTE — PROGRESS NOTES
Patient here for abnormal lab tests. He goes to [facility] twice a week to donate plasma and he has been told his liver functions are running high. He says he has been screened for hepatitis. He has morbid obesity. No lipid panel on file. He had liver functions done through our clinic a few months ago which was normal.  He takes ibuprofen regularly but no other medications over-the-counter. He has no history of drug abuse. He has been noted to have elevated blood pressures on several visits here. He does have chronic pain. Diet is poor. He denies alcohol abuse. Plan  Check labs including hepatitis panel, LFTs, ferritin, lipid panel. Also check A1c. Start Norvasc if agreeable with patient for hypertension. Low-salt and low-fat diet. I'll up in a month with lab tests. Release of records has also been sent to [facility] to obtain labs done there. Follow-up in a month for blood pressure check and lab tests. Attending Physician Statement  I have discussed the care of Al Bay, including pertinent history and exam findings,  with the resident. I have reviewed the key elements of all parts of the encounter with the resident. I agree with the assessment, plan and orders as documented by the resident.   (GE Modifier)

## 2018-03-15 NOTE — PROGRESS NOTES
@[facility]@    [facility]    Progress Note    Date of patient's visit: 3/15/2018    Patient's Name:  Nuha John    YOB: 1990            Patient Care Team:  Nina Chery MD as PCP - General (Internal Medicine)    REASON FOR VISIT: Routine outpatient follow     Chief Complaint   Patient presents with    Discuss Medications     ALT lab came back abnormal          HISTORY OF PRESENT ILLNESS:    History was obtained from the patient. Nuha John is a 32 y.o. is here for Elevated liver enzyme. Patient states his plasma donor and he donates plasma twice a week. In last 2 weeks his blood is tested for 4 time and every time his ALT is elevated. He is here today abou to discuss about elevated ALT. Patient states he often gets blood work done at the plasma center and his hepatitis panel is negative. He denies history of alcohol abuse, history of IV drug abuse, history of sexual contact. He denies pain in the abdomen, change in the color of the skin or eyes, change in the color of urine or stool. He denies diarrhea or constipation. He denies any new symptoms. Denies any over-the-counter medication intake except Motrin. He states he takes 400 mg Motrin  one tablet per day. He denies history of acetaminophen intake or any herbal medication intake. He did not bring his blood work done at [facility]. We'll get records from [facility]. He is morbidly obese and his BMI is 41.         Past Medical History:   Diagnosis Date    Dental bridge present     2 PERMANENT UPPER FRONT    Difficult intravenous access     Hand fracture, right 6/23/2015    punched a refridgerator    PONV (postoperative nausea and vomiting)     Wears glasses        Past Surgical History:   Procedure Laterality Date    FRACTURE SURGERY Right 06/30/2015    5 th metacarpal    ORTHOPEDIC SURGERY Right 12/3/15    Open Release Wrist 1st Dorsal Compartment    WRIST SURGERY Right 09/27/2016    dequervains relaese         ALLERGIES    No Known Allergies    MEDICATIONS:      Current Outpatient Prescriptions on File Prior to Visit   Medication Sig Dispense Refill    ibuprofen (ADVIL;MOTRIN) 400 MG tablet Take 1 tablet by mouth every 6 hours as needed for Pain 15 tablet 0    dexamethasone (DECADRON) 4 MG tablet take 2 tablets by mouth AS ONE DOSE (TAKE IN 24 TO 48 HOURS IF NEEDED FOR THROAT SWELLING)  0    pregabalin (LYRICA) 25 MG capsule Take 1 capsule by mouth 2 times daily for 30 days. 60 capsule 3    gabapentin (NEURONTIN) 100 MG capsule Take 1 capsule by mouth daily 90 capsule 3    diclofenac sodium 1 % GEL Apply 2 g topically 2 times daily 1 Tube 3     No current facility-administered medications on file prior to visit. SOCIAL HISTORY    Reviewed and no change from previous record. Tanya Garber  reports that he has been smoking Cigarettes. He has a 6.00 pack-year smoking history. He has never used smokeless tobacco.    FAMILY HISTORY:    Reviewed and No change from previous visit    HEALTH MAINTENANCE DUE:      Health Maintenance Due   Topic Date Due    HIV screen  12/26/2005       REVIEW OF SYSTEMS:    12 point review of symptoms completed and found to be normal except noted in the HPI    · Constitutional: Negative for Fever, chills  · Eyes: Negative for visual changes, diplopia  · ENT: Negative for mouth sores, sore throat. · Cardiovascular: Negative for lightheadedness ,chest pain, palpitations   · Respiratory:Negative for Shortness of breath,cough or wheezing. · Gastrointestinal: Negative for nausea/vomiting, change in bowel habits, abdominal pain  · Genitourinary:Negative for change in bladder habits, dysuria, hematuria.   · Musculoskeletal: Negative for joint pain   · Neurological: Negative for headache, change in muscle strength numbness/tingling  · Psychiatric: negative for change in mood, affect     PHYSICAL EXAM:      Vitals:    03/15/18 0913 03/15/18 8614

## 2018-03-15 NOTE — PROGRESS NOTES
Visit Information    Have you changed or started any medications since your last visit including any over-the-counter medicines, vitamins, or herbal medicines? no   Have you stopped taking any of your medications? Is so, why? -  no  Are you having any side effects from any of your medications? - no    Have you seen any other physician or provider since your last visit?  no   Have you had any other diagnostic tests since your last visit? yes - labs   Have you been seen in the emergency room and/or had an admission in a hospital since we last saw you?  yes -    Have you had your routine dental cleaning in the past 6 months?  no     Do you have an active MyChart account? If no, what is the barrier?   No:     Patient Care Team:  Levar Esteves MD as PCP - General (Internal Medicine)    Medical History Review  Past Medical, Family, and Social History reviewed and does not contribute to the patient presenting condition    Health Maintenance   Topic Date Due    HIV screen  12/26/2005    DTaP/Tdap/Td vaccine (2 - Td) 12/05/2027    Flu vaccine  Completed    Pneumococcal med risk  Completed

## 2018-03-16 PROBLEM — E78.00 PURE HYPERCHOLESTEROLEMIA: Status: ACTIVE | Noted: 2018-03-16

## 2018-03-16 PROBLEM — R73.03 PREDIABETES: Status: ACTIVE | Noted: 2018-03-16

## 2018-05-17 ENCOUNTER — TELEPHONE (OUTPATIENT)
Dept: INTERNAL MEDICINE | Age: 28
End: 2018-05-17

## 2018-09-13 ENCOUNTER — APPOINTMENT (OUTPATIENT)
Dept: GENERAL RADIOLOGY | Age: 28
End: 2018-09-13
Payer: MEDICARE

## 2018-09-13 ENCOUNTER — HOSPITAL ENCOUNTER (EMERGENCY)
Age: 28
Discharge: HOME | End: 2018-09-13
Attending: EMERGENCY MEDICINE
Payer: MEDICARE

## 2018-09-13 VITALS
TEMPERATURE: 98 F | WEIGHT: 295 LBS | BODY MASS INDEX: 39.1 KG/M2 | SYSTOLIC BLOOD PRESSURE: 141 MMHG | HEIGHT: 73 IN | DIASTOLIC BLOOD PRESSURE: 76 MMHG | OXYGEN SATURATION: 98 % | RESPIRATION RATE: 18 BRPM | HEART RATE: 74 BPM

## 2018-09-13 DIAGNOSIS — T14.8XXA ABRASION: ICD-10-CM

## 2018-09-13 DIAGNOSIS — S60.221A CONTUSION OF RIGHT HAND, INITIAL ENCOUNTER: Primary | ICD-10-CM

## 2018-09-13 PROCEDURE — 6370000000 HC RX 637 (ALT 250 FOR IP): Performed by: NURSE PRACTITIONER

## 2018-09-13 PROCEDURE — 99283 EMERGENCY DEPT VISIT LOW MDM: CPT

## 2018-09-13 PROCEDURE — 73130 X-RAY EXAM OF HAND: CPT

## 2018-09-13 RX ORDER — IBUPROFEN 800 MG/1
800 TABLET ORAL ONCE
Status: COMPLETED | OUTPATIENT
Start: 2018-09-13 | End: 2018-09-13

## 2018-09-13 RX ORDER — IBUPROFEN 800 MG/1
800 TABLET ORAL EVERY 8 HOURS PRN
Qty: 20 TABLET | Refills: 0 | Status: SHIPPED | OUTPATIENT
Start: 2018-09-13 | End: 2022-10-11 | Stop reason: SDUPTHER

## 2018-09-13 RX ADMIN — IBUPROFEN 800 MG: 800 TABLET ORAL at 15:38

## 2018-09-13 ASSESSMENT — PAIN SCALES - GENERAL
PAINLEVEL_OUTOF10: 10
PAINLEVEL_OUTOF10: 10

## 2018-09-13 ASSESSMENT — ENCOUNTER SYMPTOMS
TROUBLE SWALLOWING: 0
SHORTNESS OF BREATH: 0
NAUSEA: 0
VOMITING: 0
COUGH: 0

## 2018-09-13 ASSESSMENT — PAIN DESCRIPTION - DESCRIPTORS: DESCRIPTORS: ACHING

## 2018-09-13 ASSESSMENT — PAIN DESCRIPTION - FREQUENCY: FREQUENCY: CONTINUOUS

## 2018-09-13 ASSESSMENT — PAIN DESCRIPTION - ORIENTATION: ORIENTATION: RIGHT

## 2018-09-13 ASSESSMENT — PAIN DESCRIPTION - LOCATION: LOCATION: HAND

## 2018-09-13 NOTE — ED PROVIDER NOTES
within normal range or not returned as of this dictation. EMERGENCY DEPARTMENT COURSE and DIFFERENTIAL DIAGNOSIS/MDM:   Patient to ED for evaluation of Right hand injury. X-ray negative for acute osseous abnormality. Ace wrap, ice pack, ibuprofen. Okay to discharge home. Follow-up with PCP as directed. Return to ED if worse. Vitals:    Vitals:    09/13/18 1514   BP: (!) 141/76   Pulse: 74   Resp: 18   Temp: 98 °F (36.7 °C)   TempSrc: Oral   SpO2: 98%   Weight: 295 lb (133.8 kg)   Height: 6' 1\" (1.854 m)       Vitals reviewed. PROCEDURES:  Ace wrap per RN    FINAL IMPRESSION      1. Contusion of right hand, initial encounter    2.  Abrasion          DISPOSITION/PLAN   DISPOSITION Decision To Discharge 09/13/2018 03:51:30 PM      PATIENT REFERRED TO:  Noah Escalera MD  [address] [place], [place] [address] [phone]    Schedule an appointment as soon as possible for a visit in 1 day  Follow up visit    [facility] ED  [place] [id number] [phone]    If symptoms worsen      DISCHARGE MEDICATIONS:  New Prescriptions    IBUPROFEN (ADVIL;MOTRIN) 800 MG TABLET    Take 1 tablet by mouth every 8 hours as needed for Pain       (Please note that portions of this note were completed with a voice recognition program.  Efforts were made to edit the dictations but occasionally words are mis-transcribed.)    Marylee Bogus, [place], ROSSANA - CNP  09/13/18 0982

## 2019-04-30 ENCOUNTER — HOSPITAL ENCOUNTER (EMERGENCY)
Age: 29
Discharge: HOME | End: 2019-04-30
Attending: EMERGENCY MEDICINE

## 2019-04-30 ENCOUNTER — APPOINTMENT (OUTPATIENT)
Dept: GENERAL RADIOLOGY | Age: 29
End: 2019-04-30

## 2019-04-30 VITALS
BODY MASS INDEX: 39.76 KG/M2 | DIASTOLIC BLOOD PRESSURE: 70 MMHG | TEMPERATURE: 99.1 F | WEIGHT: 300 LBS | HEIGHT: 73 IN | HEART RATE: 95 BPM | RESPIRATION RATE: 16 BRPM | OXYGEN SATURATION: 99 % | SYSTOLIC BLOOD PRESSURE: 150 MMHG

## 2019-04-30 DIAGNOSIS — M25.531 RIGHT WRIST PAIN: Primary | ICD-10-CM

## 2019-04-30 PROCEDURE — 73110 X-RAY EXAM OF WRIST: CPT

## 2019-04-30 PROCEDURE — 99283 EMERGENCY DEPT VISIT LOW MDM: CPT

## 2019-04-30 RX ORDER — TRAMADOL HYDROCHLORIDE 50 MG/1
50 TABLET ORAL EVERY 6 HOURS PRN
Qty: 12 TABLET | Refills: 0 | Status: SHIPPED | OUTPATIENT
Start: 2019-04-30 | End: 2019-05-03

## 2019-04-30 ASSESSMENT — PAIN DESCRIPTION - LOCATION: LOCATION: WRIST

## 2019-04-30 ASSESSMENT — PAIN DESCRIPTION - DESCRIPTORS: DESCRIPTORS: SHARP

## 2019-04-30 ASSESSMENT — PAIN SCALES - GENERAL: PAINLEVEL_OUTOF10: 6

## 2019-04-30 ASSESSMENT — PAIN DESCRIPTION - ORIENTATION: ORIENTATION: RIGHT

## 2019-05-01 NOTE — ED PROVIDER NOTES
[facility] ED  eMERGENCY dEPARTMENT eNCOUnter      Pt Name: Mojgan Saucedo  MRN: [id number]  [place] 1990  Date of evaluation: 4/30/2019  Provider: Dana Richey PA-C    CHIEF COMPLAINT       Chief Complaint   Patient presents with    Hand Pain     right           HISTORY OF PRESENT ILLNESS  (Location/Symptom, Timing/Onset, Context/Setting, Quality, Duration, Modifying Factors, Severity.)   Alexia Eubanks II is a 29 y.o. male who presents to the emergency department complains of right wrist pain and swelling that began today. Patient denies any injury. He reports history of prior surgeries to right wrist.  Patient does have an orthopedic surgeon at [facility]. Patient reports he does use his hands a lot at work. States most of his pain is over the radial aspect of the wrist.  He denies any numbness or tingling. No erythema, warmth, fever, chills, nausea, vomiting, abdominal pain. No other complaints. Nursing Notes were reviewed. REVIEW OF SYSTEMS    (2-9 systems for level 4, 10 or more for level 5)     Review of Systems   Right wrist pain, swelling     Except as noted above the remainder of the review of systems was reviewed and negative.        PAST MEDICAL HISTORY     Past Medical History:   Diagnosis Date    Dental bridge present     2 PERMANENT UPPER FRONT    Difficult intravenous access     Hand fracture, right 6/23/2015    punched a refridgerator    PONV (postoperative nausea and vomiting)     Wears glasses      None otherwise stated in nurses notes    SURGICAL HISTORY       Past Surgical History:   Procedure Laterality Date    FRACTURE SURGERY Right 06/30/2015    5 th metacarpal    ORTHOPEDIC SURGERY Right 12/3/15    Open Release Wrist 1st Dorsal Compartment    WRIST SURGERY Right 09/27/2016    dequervains relaese     None otherwise stated in nurses notes    [address]       Discharge Medication List as of 4/30/2019  9:44 PM      CONTINUE these medications which have NOT CHANGED    Details   !! ibuprofen (ADVIL;MOTRIN) 800 MG tablet Take 1 tablet by mouth every 8 hours as needed for Pain, Disp-20 tablet, R-0Print      amLODIPine (NORVASC) 5 MG tablet Take 1 tablet by mouth daily, Disp-30 tablet, R-3Normal      !! ibuprofen (ADVIL;MOTRIN) 400 MG tablet Take 1 tablet by mouth every 6 hours as needed for Pain, Disp-15 tablet, R-0Print      diclofenac sodium 1 % GEL Apply 2 g topically 2 times daily, Topical, 2 TIMES DAILY Starting 2017, Disp-1 Tube, R-3, Normal       !! - Potential duplicate medications found. Please discuss with provider. ALLERGIES     Patient has no known allergies. FAMILY HISTORY           Problem Relation Age of Onset    Other Father         SICKLE CELL AMENIA    Asthma Brother     Mental Illness Paternal Grandmother         SCHIZOPHRENIA     Family Status   Relation Name Status    Mother Sathish Shah Father Chet Mclaughlin  at age 39   [address]  Alive    MGM      MGF      [facility]  Alive    PGF      [place]  Alive    [address]  Alive    Brother [address] [place] [address]      None otherwise stated in nurses notes    SOCIAL HISTORY      reports that he has been smoking cigarettes. He has a 6.00 pack-year smoking history. He has never used smokeless tobacco. He reports that he has current or past drug history. Drug: Marijuana. He reports that he does not drink alcohol. lives at home with others     PHYSICAL EXAM    (up to 7 for level 4, 8 or more for level 5)     ED Triage Vitals [19]   BP Temp Temp Source Pulse Resp SpO2 Height Weight   (!) 150/70 99.1 °F (37.3 °C) Oral 95 16 99 % 6' 1\" (1.854 m) 300 lb (136.1 kg)       Physical Exam   Nursing note and vitals reviewed. Constitutional: Oriented to person, place, and time and well-developed, well-nourished. Head: Normocephalic and atraumatic.    Ear: External ears normal.   Nose: Nose normal and midline. Eyes: Conjunctivae and EOM are normal. Pupils are equal, round, and reactive to light. Neck: Normal range of motion. Neck supple. Throat: Posterior pharynx is without erythema or exudates, airway is patent, no swelling  Cardiovascular: Normal rate, regular rhythm, normal heart sounds and intact distal pulses. Pulmonary/Chest: Effort normal and breath sounds normal. No respiratory distress. No wheezes. No rales. No chest tenderness. Abdominal: Soft. Bowel sounds are normal. No distension and no mass. There is no tenderness. There is no rebound and no guarding. Musculoskeletal: examination of right wrist reveals moderate swelling. No erythema, warmth. Surgical scars noted. Patient has tenderness over the radial aspect of the wrist.  No snuffbox tenderness. Full range of motion with pain. No hand tenderness. 2/2 radial pulse. Brisk capillary refill. Distal sensation intact. Neurological: Alert and oriented to person, place, and time. GCS score is 15. Skin: Skin is warm and dry. No rash noted. No erythema. No pallor. Psychiatric: Mood, memory, affect and judgment normal.           DIAGNOSTIC RESULTS     EKG: All EKG's are interpreted by the Emergency Department Physician who either signs or Co-signs this chart in the absence of a cardiologist.        RADIOLOGY:   All plain film, CT, MRI, and formal ultrasound images (except ED bedside ultrasound) are read by the radiologist, see reports below, unless otherwise noted in MDM or here.    XR WRIST RIGHT (MIN 3 VIEWS)   Final Result   No acute bony or joint abnormality             Xr Wrist Right (min 3 Views)    Result Date: 4/30/2019  EXAMINATION: 3 XRAY VIEWS OF THE RIGHT WRIST 4/30/2019 8:59 pm COMPARISON: 09/13/2018 HISTORY: ORDERING SYSTEM PROVIDED HISTORY: pain over radial, hx of surgery TECHNOLOGIST PROVIDED HISTORY: pain over radial, hx of surgery Ordering Physician Provided Reason for Exam: pain over radial, hx of surgery Acuity: Acute Type of Exam: Initial FINDINGS: Anatomic alignment. No fracture. No destructive bony abnormality. Previous internal fixation of the 5th metacarpal.  Stable cyst in the distal pole of the scaphoid. No acute bony or joint abnormality           LABS:  Labs Reviewed - No data to display    All other labs were within normal range or not returned as of this dictation. EMERGENCY DEPARTMENT COURSE and DIFFERENTIAL DIAGNOSIS/MDM:   Vitals:    Vitals:    04/30/19 2033   BP: (!) 150/70   Pulse: 95   Resp: 16   Temp: 99.1 °F (37.3 °C)   TempSrc: Oral   SpO2: 99%   Weight: 300 lb (136.1 kg)   Height: 6' 1\" (1.854 m)         Patient instructed to return to the emergency room if symptoms worsen, return, or any other concern right away which is agreed by the patient    ED MEDS:  Orders Placed This Encounter   Medications    traMADol (ULTRAM) 50 MG tablet     Sig: Take 1 tablet by mouth every 6 hours as needed for Pain for up to 3 days. Intended supply: 3 days. Take lowest dose possible to manage pain     Dispense:  12 tablet     Refill:  0         CONSULTS:  None    PROCEDURES:  None      FINAL IMPRESSION      1. Right wrist pain          DISPOSITION/PLAN   DISPOSITION Decision To Discharge    PATIENT REFERRED TO:  Cuate Akhtar ProcAnibal [address] [address]  [phone]    Call       [facility] ED  [facility] [address] [facility] [address]  [phone]    If symptoms worsen      DISCHARGE MEDICATIONS:  Discharge Medication List as of 4/30/2019  9:44 PM      START taking these medications    Details   traMADol (ULTRAM) 50 MG tablet Take 1 tablet by mouth every 6 hours as needed for Pain for up to 3 days. Intended supply: 3 days. Take lowest dose possible to manage pain, Disp-12 tablet, R-0Print               Summation      Patient Course:      Pain swelling over right wrist that began today. No known injury.   Patient has had several surgeries on this

## 2019-05-01 NOTE — ED NOTES
Pt discharged in stable condition with prescriptions and dc instructions. Pt ambulates to door with steady gait and without assistance.         Yina Magallon RN  04/30/19 5725

## 2019-09-13 ENCOUNTER — HOSPITAL ENCOUNTER (EMERGENCY)
Age: 29
Discharge: HOME | End: 2019-09-13
Attending: EMERGENCY MEDICINE

## 2019-09-13 ENCOUNTER — APPOINTMENT (OUTPATIENT)
Dept: GENERAL RADIOLOGY | Age: 29
End: 2019-09-13

## 2019-09-13 VITALS
BODY MASS INDEX: 41.08 KG/M2 | TEMPERATURE: 98.4 F | SYSTOLIC BLOOD PRESSURE: 134 MMHG | RESPIRATION RATE: 16 BRPM | WEIGHT: 310 LBS | DIASTOLIC BLOOD PRESSURE: 77 MMHG | HEIGHT: 73 IN | OXYGEN SATURATION: 98 % | HEART RATE: 88 BPM

## 2019-09-13 DIAGNOSIS — M79.641 HAND PAIN, RIGHT: Primary | ICD-10-CM

## 2019-09-13 PROCEDURE — 99283 EMERGENCY DEPT VISIT LOW MDM: CPT

## 2019-09-13 PROCEDURE — 73130 X-RAY EXAM OF HAND: CPT

## 2019-09-13 RX ORDER — ACETAMINOPHEN 500 MG
1000 TABLET ORAL EVERY 6 HOURS PRN
COMMUNITY
End: 2022-10-11 | Stop reason: SDUPTHER

## 2019-09-13 ASSESSMENT — PAIN DESCRIPTION - LOCATION: LOCATION: HAND

## 2019-09-13 ASSESSMENT — PAIN SCALES - GENERAL: PAINLEVEL_OUTOF10: 5

## 2019-09-13 ASSESSMENT — PAIN DESCRIPTION - ORIENTATION: ORIENTATION: RIGHT

## 2019-09-13 ASSESSMENT — PAIN DESCRIPTION - DESCRIPTORS: DESCRIPTORS: ACHING

## 2019-09-13 ASSESSMENT — PAIN DESCRIPTION - PAIN TYPE: TYPE: ACUTE PAIN

## 2019-09-13 ASSESSMENT — PAIN DESCRIPTION - ONSET: ONSET: ON-GOING

## 2019-09-13 ASSESSMENT — PAIN DESCRIPTION - FREQUENCY: FREQUENCY: CONTINUOUS

## 2019-09-13 NOTE — ED PROVIDER NOTES
[address] ED  eMERGENCY dEPARTMENT eNCOUnter      Pt Name: David Serrano  MRN: [id number]  [place] 1990  Date of evaluation: 9/13/2019  Provider: Yu Loaiza PA-C    CHIEF COMPLAINT       Chief Complaint   Patient presents with    Hand Pain           HISTORY OF PRESENT ILLNESS  (Location/Symptom, Timing/Onset, Context/Setting, Quality, Duration, Modifying Factors, Severity.)   Aleksander Layne II is a 29 y.o. male who presents to the emergency department with complaints of right hand pain. Pt states he was using tape to pack up boxes when he felt a pop over his right 5th metacarpal.  Pt states there is now a bump in this area. States the pain was severe when it happened but is now just a discomfort. Pt states he has had surgery in his hand and has hardware. He denies numbness. No other complaints. Nursing Notes were reviewed. REVIEW OF SYSTEMS    (2-9 systems for level 4, 10 or more for level 5)     Review of Systems   Hand pain  Swelling     Except as noted above the remainder of the review of systems was reviewed and negative.        PAST MEDICAL HISTORY     Past Medical History:   Diagnosis Date    Dental bridge present     2 PERMANENT UPPER FRONT    Difficult intravenous access     Hand fracture, right 6/23/2015    punched a refridgerator    PONV (postoperative nausea and vomiting)     Wears glasses      None otherwise stated in nurses notes    SURGICAL HISTORY       Past Surgical History:   Procedure Laterality Date    FRACTURE SURGERY Right 06/30/2015    5 th metacarpal    ORTHOPEDIC SURGERY Right 12/3/15    Open Release Wrist 1st Dorsal Compartment    WRIST SURGERY Right 09/27/2016    dequervains relaese     None otherwise stated in nurses notes    CURRENT MEDICATIONS       Previous Medications    ACETAMINOPHEN (TYLENOL) 500 MG TABLET    Take 1,000 mg by mouth every 6 hours as needed for Pain    AMLODIPINE (NORVASC) 5 MG TABLET    Take 1 tablet by mouth daily pulses. Pulmonary/Chest: Effort normal and breath sounds normal. No respiratory distress. No wheezes. No rales. No chest tenderness. Abdominal: Soft. Bowel sounds are normal. No distension and no mass. There is no tenderness. There is no rebound and no guarding. Musculoskeletal: Examination of right hand reveals pain over the fifth metacarpal with associated swelling. There is no erythema or abrasions noted. Surgical scar noted with no signs of infection. Patient has full range of motion. 5/5 strength. Good  strength. Brisk cap refill. Distal sensation intact. 2/2 radial pulse. No wrist tenderness. Neurological: Alert and oriented to person, place, and time. GCS score is 15. Skin: Skin is warm and dry. No rash noted. No erythema. No pallor. Psychiatric: Mood, memory, affect and judgment normal.           DIAGNOSTIC RESULTS     EKG: All EKG's are interpreted by the Emergency Department Physician who either signs or Co-signs this chart in the absence of a cardiologist.        RADIOLOGY:   All plain film, CT, MRI, and formal ultrasound images (except ED bedside ultrasound) are read by the radiologist, see reports below, unless otherwise noted in MDM or here. XR HAND RIGHT (MIN 3 VIEWS)    (Results Pending)   XR HAND RIGHT (MIN 3 VIEWS)    (Results Pending)         LABS:  Labs Reviewed - No data to display    All other labs were within normal range or not returned as of this dictation.     EMERGENCY DEPARTMENT COURSE and DIFFERENTIAL DIAGNOSIS/MDM:   Vitals:    Vitals:    09/13/19 1917   BP: 134/77   Pulse: 88   Resp: 16   Temp: 98.4 °F (36.9 °C)   TempSrc: Oral   SpO2: 98%   Weight: (!) 310 lb (140.6 kg)   Height: 6' 1\" (1.854 m)         Patient instructed to return to the emergency room if symptoms worsen, return, or any other concern right away which is agreed by the patient    ED MEDS:  No orders of the defined types were placed in this

## 2022-06-15 NOTE — ED PROVIDER NOTES
eMERGENCY dEPARTMENT eNCOUnter   [address] Name: Mojgan Saucedo  MRN: [id number]  [place] 1990  Date of evaluation: 5/2/19     Alexia Eubanks II is a 29 y.o. male with CC: Hand Pain (right)      Based on the medical record the care appears appropriate. I was personally available for consultation in the Emergency Department.     Martinez Marcum MD  Attending Emergency Physician                    Amy Rahman MD  05/02/19 8539 The patient is a 29y Female complaining of fever. The patient has been re-examined and I agree with the above assessment or I updated with my findings.

## 2022-09-20 ENCOUNTER — HOSPITAL ENCOUNTER (EMERGENCY)
Age: 32
Discharge: HOME | End: 2022-09-20
Attending: EMERGENCY MEDICINE

## 2022-09-20 VITALS
RESPIRATION RATE: 10 BRPM | HEART RATE: 85 BPM | TEMPERATURE: 98.2 F | OXYGEN SATURATION: 98 % | DIASTOLIC BLOOD PRESSURE: 93 MMHG | SYSTOLIC BLOOD PRESSURE: 154 MMHG

## 2022-09-20 DIAGNOSIS — S61.210A LACERATION OF RIGHT INDEX FINGER WITHOUT FOREIGN BODY WITHOUT DAMAGE TO NAIL, INITIAL ENCOUNTER: Primary | ICD-10-CM

## 2022-09-20 PROCEDURE — 6360000002 HC RX W HCPCS

## 2022-09-20 PROCEDURE — 6370000000 HC RX 637 (ALT 250 FOR IP)

## 2022-09-20 PROCEDURE — 12002 RPR S/N/AX/GEN/TRNK2.6-7.5CM: CPT

## 2022-09-20 PROCEDURE — 90715 TDAP VACCINE 7 YRS/> IM: CPT

## 2022-09-20 PROCEDURE — 90471 IMMUNIZATION ADMIN: CPT

## 2022-09-20 PROCEDURE — 99284 EMERGENCY DEPT VISIT MOD MDM: CPT

## 2022-09-20 RX ORDER — IBUPROFEN 400 MG/1
600 TABLET ORAL ONCE
Status: COMPLETED | OUTPATIENT
Start: 2022-09-20 | End: 2022-09-20

## 2022-09-20 RX ORDER — CEPHALEXIN 500 MG/1
500 CAPSULE ORAL 2 TIMES DAILY
Qty: 14 CAPSULE | Refills: 0 | Status: SHIPPED | OUTPATIENT
Start: 2022-09-20 | End: 2022-09-27

## 2022-09-20 RX ADMIN — TETANUS TOXOID, REDUCED DIPHTHERIA TOXOID AND ACELLULAR PERTUSSIS VACCINE, ADSORBED 0.5 ML: 5; 2.5; 8; 8; 2.5 SUSPENSION INTRAMUSCULAR at 14:01

## 2022-09-20 RX ADMIN — IBUPROFEN 600 MG: 400 TABLET, FILM COATED ORAL at 14:00

## 2022-09-20 NOTE — ED NOTES
This patient was assessed by the doctor only. Nurse processed and completed the orders from this doctor ie labs, meds, and/or EKG.       Feng Carreon, MILAGRO  19/12/24 1647

## 2022-09-20 NOTE — ED PROVIDER NOTES
[address] [facility]     Emergency Department     Faculty Attestation    I performed a history and physical examination of the patient and discussed management with the resident. I reviewed the residents note and agree with the documented findings including all diagnostic interpretations and plan of care. Any areas of disagreement are noted on the chart. I was personally present for the key portions of any procedures. I have documented in the chart those procedures where I was not present during the key portions. I have reviewed the emergency nurses triage note. I agree with the chief complaint, past medical history, past surgical history, allergies, medications, social and family history as documented unless otherwise noted below. Documentation of the HPI, Physical Exam and Medical Decision Making performed by scribmalcolm is based on my personal performance of the HPI, PE and MDM. For Physician Assistant/ Nurse Practitioner cases/documentation I have personally evaluated this patient and have completed at least one if not all key elements of the E/M (history, physical exam, and MDM). Additional findings are as noted. Primary Care Physician: Gael Del Toro MD    History: This is a 32 y.o. male who presents to the Emergency Department with complaint of laceration. R index finger. Unclear on tetanus status. Cut on piece of metal sticking out of drywall. No numbness, no other injuries    Physical:     oral temperature is 98.2 °F (36.8 °C). His blood pressure is 154/93 (abnormal) and his pulse is 85. His respiration is 10 and oxygen saturation is 98%.    32 y.o. male no acute distress, there is a laceration over the right index finger that is partially avulsion in nature. No active bleeding at this time. Does have some decrease sensation over the pad of the finger. Able to move the finger however capillary refill less than 2 seconds.     Impression: Finger laceration    Plan: Washout, update tetanus, repair      Park Guerra MD, Kaleb Slaughter  Attending Emergency Physician         Diann Parker MD  09/20/22 4718

## 2022-09-20 NOTE — DISCHARGE INSTRUCTIONS
Finish keflex 2 times a day for 7 days    Take motrin/tylenol every 6 hours as needed    Do not wet the wound    Follow up with hand surgeon if can not bend the tip of the index finger or numbness by next Monday, call on Tuesday

## 2022-09-20 NOTE — ED PROVIDER NOTES
[address]  ED  Emergency Department Encounter  Emergency Medicine Resident     Pt Darrel Heath II  MRN: [id number]  [place] 1990  Date of evaluation: 9/20/22  PCP:  Virgil Pérez MD      CHIEF COMPLAINT       Chief Complaint   Patient presents with    Laceration     Cut by metal, R index finger       HISTORY OF PRESENT ILLNESS  (Location/Symptom, Timing/Onset, Context/Setting, Quality, Duration, Modifying Factors, Severity.)      Dawna Pearson II is a 32 y.o. male who presents with right index finger. Pt. States he had his right index finger cut by a metal around 11 am. Pt denies any other cuts and feels some numbness on the tip of the finger. PAST MEDICAL / SURGICAL / SOCIAL / FAMILY HISTORY      has a past medical history of Dental bridge present, Difficult intravenous access, Hand fracture, right, PONV (postoperative nausea and vomiting), and Wears glasses. N/A     has a past surgical history that includes fracture surgery (Right, 06/30/2015); orthopedic surgery (Right, 12/3/15); and Wrist surgery (Right, 09/27/2016).   N/A    Social History     Socioeconomic History    Marital status: Single     Spouse name: Not on file    Number of children: Not on file    Years of education: Not on file    Highest education level: Not on file   Occupational History    Not on file   Tobacco Use    Smoking status: Every Day     Packs/day: 2.00     Years: 3.00     Pack years: 6.00     Types: Cigarettes    Smokeless tobacco: Never   Substance and Sexual Activity    Alcohol use: No     Comment: LAST USE 01/2016    Drug use: Yes     Types: Marijuana [place])     Comment: LAST USE FIRST WEEK OF AUGUST    Sexual activity: Not on file   Other Topics Concern    Not on file   Social History Narrative    Not on file     Social Determinants of Health     Financial Resource Strain: Not on file   Food Insecurity: Not on file   Transportation Needs: Not on file   Physical Activity: Not on file Stress: Not on file   Social Connections: Not on file   Intimate Partner Violence: Not on file   Housing Stability: Not on file       Family History   Problem Relation Age of Onset    Other Father         SICKLE CELL AMENIA    Asthma Brother     Mental Illness Paternal Grandmother         SCHIZOPHRENIA       Allergies:  Patient has no known allergies. Home Medications:  Prior to Admission medications    Medication Sig Start Date End Date Taking? Authorizing Provider   cephALEXin (KEFLEX) 500 MG capsule Take 1 capsule by mouth 2 times daily for 7 days 9/20/22 9/27/22 Yes Tami Vaughn MD   acetaminophen (TYLENOL) 500 MG tablet Take 1,000 mg by mouth every 6 hours as needed for Pain    Historical Provider, MD   ibuprofen (ADVIL;MOTRIN) 800 MG tablet Take 1 tablet by mouth every 8 hours as needed for Pain 9/13/18   ROSSANA Hays - CNP   amLODIPine (NORVASC) 5 MG tablet Take 1 tablet by mouth daily 3/15/18   Bernie Garzon MD   ibuprofen (ADVIL;MOTRIN) 400 MG tablet Take 1 tablet by mouth every 6 hours as needed for Pain 1/1/18   Andrew Garza MD   diclofenac sodium 1 % GEL Apply 2 g topically 2 times daily 12/5/17   Laurita Valentine MD       REVIEW OF SYSTEMS    (2-9 systems for level 4, 10 or more for level 5)      Review of Systems   Constitutional:  Negative for fever. Cardiovascular:  Negative for chest pain. Neurological:  Positive for numbness. Negative for weakness. PHYSICAL EXAM   (up to 7 for level 4, 8 or more for level 5)      INITIAL VITALS:   BP (!) 154/93   Pulse 85   Temp 98.2 °F (36.8 °C) (Oral)   Resp 10   SpO2 98%     Physical Exam  Constitutional:       Appearance: Normal appearance. He is normal weight. Cardiovascular:      Rate and Rhythm: Normal rate and regular rhythm. Pulmonary:      Effort: Pulmonary effort is normal.      Breath sounds: Normal breath sounds. Abdominal:      General: Abdomen is flat. Palpations: Abdomen is soft.    Musculoskeletal: General: Normal range of motion. Skin:     Findings: Lesion present. Neurological:      Mental Status: He is alert. DIFFERENTIAL  DIAGNOSIS     PLAN (LABS / IMAGING / EKG):  No orders of the defined types were placed in this encounter. MEDICATIONS ORDERED:  Orders Placed This Encounter   Medications    Tetanus-Diphth-Acell Pertussis (BOOSTRIX) injection 0.5 mL    ibuprofen (ADVIL;MOTRIN) tablet 600 mg    cephALEXin (KEFLEX) 500 MG capsule     Sig: Take 1 capsule by mouth 2 times daily for 7 days     Dispense:  14 capsule     Refill:  0       DDX/MDM: Judy Jennings II is a 32 y.o. male who presents with right index finger. Finger cut lesion see above picture. We will clean the lesion up and suture. DIAGNOSTIC RESULTS / EMERGENCY DEPARTMENT COURSE / MDM   LAB RESULTS:  No results found for this visit on 09/20/22. IMPRESSION: Finger laceration    RADIOLOGY:  No orders to display         EKG  N/A    All EKG's are interpreted by the Emergency Department Physician who either signs or Co-signs this chart in the absence of a cardiologist.    EMERGENCY DEPARTMENT COURSE:     -Wound clean and suture   -Tdap        ED Course as of 09/20/22 1354   Tue Sep 20, 2022   1352 Keflex is prescribed for PPX [YX]      ED Course User Index  [YX] Shabnam Neff MD       No notes of EC Admission Criteria type on file. PROCEDURES:      CONSULTS:  None    CRITICAL CARE:  N/A      FINAL IMPRESSION      1.  Laceration of right index finger without foreign body without damage to nail, initial encounter          DISPOSITION / PLAN     DISPOSITION Decision To Discharge 09/20/2022 01:33:49 PM      PATIENT REFERRED TO:  Joyce Galicia MD  [address] [id number]    In 3 days  If symptoms worsen    [facility]  [address]  [address] [facility] [address] [place] [address]  [phone]  Schedule an appointment as soon as possible for a visit in 1 week  Call for

## 2022-10-11 ENCOUNTER — HOSPITAL ENCOUNTER (EMERGENCY)
Age: 32
Discharge: HOME | End: 2022-10-11
Attending: EMERGENCY MEDICINE

## 2022-10-11 ENCOUNTER — APPOINTMENT (OUTPATIENT)
Dept: GENERAL RADIOLOGY | Age: 32
End: 2022-10-11

## 2022-10-11 VITALS
HEIGHT: 73 IN | HEART RATE: 97 BPM | SYSTOLIC BLOOD PRESSURE: 173 MMHG | BODY MASS INDEX: 40.02 KG/M2 | DIASTOLIC BLOOD PRESSURE: 93 MMHG | RESPIRATION RATE: 18 BRPM | TEMPERATURE: 98.3 F | OXYGEN SATURATION: 99 % | WEIGHT: 302 LBS

## 2022-10-11 DIAGNOSIS — S61.451A DOG BITE OF RIGHT HAND, INITIAL ENCOUNTER: Primary | ICD-10-CM
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
## 2022-10-11 PROCEDURE — 99283 EMERGENCY DEPT VISIT LOW MDM: CPT

## 2022-10-11 PROCEDURE — 73130 X-RAY EXAM OF HAND: CPT

## 2022-10-11 PROCEDURE — 6370000000 HC RX 637 (ALT 250 FOR IP): Performed by: HEALTH CARE PROVIDER

## 2022-10-11 RX ORDER — ACETAMINOPHEN 500 MG
1000 TABLET ORAL EVERY 6 HOURS PRN
Qty: 30 TABLET | Refills: 0 | Status: SHIPPED | OUTPATIENT
Start: 2022-10-11

## 2022-10-11 RX ORDER — AMOXICILLIN AND CLAVULANATE POTASSIUM 875; 125 MG/1; MG/1
1 TABLET, FILM COATED ORAL 2 TIMES DAILY
Qty: 20 TABLET | Refills: 0 | Status: SHIPPED | OUTPATIENT
Start: 2022-10-11 | End: 2022-10-21

## 2022-10-11 RX ORDER — AMOXICILLIN AND CLAVULANATE POTASSIUM 875; 125 MG/1; MG/1
1 TABLET, FILM COATED ORAL ONCE
Status: COMPLETED | OUTPATIENT
Start: 2022-10-11 | End: 2022-10-11

## 2022-10-11 RX ORDER — IBUPROFEN 800 MG/1
800 TABLET ORAL EVERY 8 HOURS PRN
Qty: 20 TABLET | Refills: 0 | Status: SHIPPED | OUTPATIENT
Start: 2022-10-11

## 2022-10-11 RX ORDER — ACETAMINOPHEN 500 MG
1000 TABLET ORAL ONCE
Status: COMPLETED | OUTPATIENT
Start: 2022-10-11 | End: 2022-10-11

## 2022-10-11 RX ADMIN — AMOXICILLIN AND CLAVULANATE POTASSIUM 1 TABLET: 875; 125 TABLET, FILM COATED ORAL at 19:26

## 2022-10-11 RX ADMIN — ACETAMINOPHEN 1000 MG: 500 TABLET ORAL at 19:25

## 2022-10-11 ASSESSMENT — PAIN - FUNCTIONAL ASSESSMENT: PAIN_FUNCTIONAL_ASSESSMENT: 0-10

## 2022-10-11 ASSESSMENT — PAIN DESCRIPTION - LOCATION: LOCATION: HAND

## 2022-10-11 ASSESSMENT — PAIN DESCRIPTION - ORIENTATION: ORIENTATION: RIGHT

## 2022-10-11 ASSESSMENT — PAIN SCALES - GENERAL: PAINLEVEL_OUTOF10: 4

## 2022-10-11 NOTE — ED PROVIDER NOTES
[facility]     Emergency Department     Faculty Note/ Attestation      Pt Name: Dean Javier                                       MRN: [id number]  [place] 1990  Date of evaluation: 10/11/2022    Patients PCP:    Farrell Favre, MD    Attestation  I performed a history and physical examination of the patient and discussed management with the resident. I reviewed the residents note and agree with the documented findings and plan of care. Any areas of disagreement are noted on the chart. I was personally present for the key portions of any procedures. I have documented in the chart those procedures where I was not present during the key portions. I have reviewed the emergency nurses triage note. I agree with the chief complaint, past medical history, past surgical history, allergies, medications, social and family history as documented unless otherwise noted below. For Physician Assistant/ Nurse Practitioner cases/documentation I have personally evaluated this patient and have completed at least one if not all key elements of the E/M (history, physical exam, and MDM). Additional findings are as noted. Initial Screens:        Enmanuel Coma Scale  Eye Opening: Spontaneous  Best Verbal Response: Oriented  Best Motor Response: Obeys commands  Enmanuel Coma Scale Score: 15    Vitals:    Vitals:    10/11/22 1850   BP: (!) 173/93   Pulse: 97   Resp: 18   Temp: 98.3 °F (36.8 °C)   TempSrc: Oral   SpO2: 99%   Weight: (!) 302 lb (137 kg)   Height: 6' 1\" (1.854 m)       CHIEF COMPLAINT       Chief Complaint   Patient presents with    Animal Bite     Right hand earlier today; stray dog; tdap up to date      Patient is a 66-year-old male was breaking up a fight between his dog and one of the neighborhood dogs.   Patient notes he was not sure which dog bit him his dog is up-to-date but he can also watch the other dog for any signs of infection or abnormal behavior patient notes injury to the right dominant hand actually has prior injuries to this hand with hardware was concerned prior surgery with hand surgery here at [address] patient has ability to move fingers without difficulty patient has no weakness no redness warmth or signs of infection at this time        EMERGENCY DEPARTMENT COURSE:     -------------------------  BP: (!) 173/93, Temp: 98.3 °F (36.8 °C), Heart Rate: 97, Resp: 18  The hand was taken through full range of motion with flexion and extension. There was  no signs of any laceration puncture infection or involvement of the joint capsule. There was no injury to the tendon sheath. The is no weakness of the digits distally and full motor and sensory function is in tact. Comments  Patient x-ray also shows no fracture however patient will need close follow-up with hand surgery understands to return if any signs of redness warmth or decreased range of motion arises         Citlaly Fernandez DO,, , RDMS.   Attending Emergency Physician         Citlaly Fernandez DO  10/12/22 2595

## 2022-10-12 NOTE — DISCHARGE INSTRUCTIONS
You were in the emergency department for dog bite hand. Based on our evaluation, you are safe for discharge. Please follow-up with Dr. Gretchen Hansen, hand surgeon, in the next week. Please continue to observe the animal that bit you for any behavioral changes, or foaming at the mouth. Keep the wound clean and wash with soap and water daily. No immersion in water until a solid scab is formed. Take your medication as indicated, if you are given an antibiotic then make sure you get the prescription filled and take the antibiotics until finished. Drink plenty of water while taking the antibiotics. Avoid drinking alcohol or drinks that have caffeine in it while taking antibiotics. For pain use ibuprofen (Motrin / Advil) or acetaminophen (Tylenol), unless prescribed medications that have acetaminophen in it. You can take over the counter acetaminophen tablets (1 - 2 tablets of the 500-mg strength every 6 hours) or ibuprofen tablets (2 tablets every 4 hours). Make sure that you follow up with animal control. If they are unable to catch the cat and you want to start prophylaxis for rabies, then notify your physician or return to the emergency department. PLEASE RETURN TO THE EMERGENCY DEPARTMENT IMMEDIATELY for worsening symptoms, redness to the area, notice any drainage or if you develop any concerning symptoms such as: high fever not relieved by acetaminophen (Tylenol) and/or ibuprofen (Motrin / Advil), redness around wound, white drainage from wound, chills, shortness of breath, chest pain, feeling of your heart fluttering or racing, persistent nausea and/or vomiting, numbness, weakness or tingling in the arms or legs or change in color of the extremities, changes in mental status, persistent headache, blurry vision, unable to follow up with your physician, or other any other care or concern.

## 2022-10-13 ASSESSMENT — ENCOUNTER SYMPTOMS: SHORTNESS OF BREATH: 0

## 2022-10-13 NOTE — ED PROVIDER NOTES
[facility] ED  Emergency Department Encounter  Emergency Medicine Resident     Pt Name: Sammie Peng  MRN: [id number]  [place] 1990  Date of evaluation: 10/13/22  PCP:  Theo Lehman MD    CHIEF COMPLAINT       Chief Complaint   Patient presents with    Animal Bite     Right hand earlier today; stray dog; tdap up to date        HISTORY OFPRESENT ILLNESS  (Location/Symptom, Timing/Onset, Context/Setting, Quality, Duration, Modifying Factors,Severity.)      Sammie Peng is a 32 y.o. male who presents with dog bite to right hand. Occurred earlier today. Patient states that his pet pitbull was involved in a fight with a stray pit bull and he was trying to pull the dogs apart when one of them bit him. He is unsure which dog bit his hand. Pain is constant, throbbing and 4/10 in severity. Does have small lacerations on the lateral dorsal aspect of his hand. Tetanus is up-to-date. Denies any weakness, numbness, or tingling. PAST MEDICAL / SURGICAL / SOCIAL / FAMILY HISTORY      has a past medical history of Dental bridge present, Difficult intravenous access, Hand fracture, right, PONV (postoperative nausea and vomiting), and Wears glasses. has a past surgical history that includes fracture surgery (Right, 06/30/2015); orthopedic surgery (Right, 12/3/15); and Wrist surgery (Right, 09/27/2016).      Social History     Socioeconomic History    Marital status: Single     Spouse name: Not on file    Number of children: Not on file    Years of education: Not on file    Highest education level: Not on file   Occupational History    Not on file   Tobacco Use    Smoking status: Every Day     Packs/day: 2.00     Years: 3.00     Pack years: 6.00     Types: Cigarettes    Smokeless tobacco: Never   Substance and Sexual Activity    Alcohol use: No     Comment: LAST USE 01/2016    Drug use: Yes     Types: Marijuana [place]rel [place])     Comment: LAST USE FIRST WEEK OF AUGUST    Sexual activity: Not on file   Other Topics Concern    Not on file   Social History Narrative    Not on file     Social Determinants of Health     Financial Resource Strain: Not on file   Food Insecurity: Not on file   Transportation Needs: Not on file   Physical Activity: Not on file   Stress: Not on file   Social Connections: Not on file   Intimate Partner Violence: Not on file   Housing Stability: Not on file       Family History   Problem Relation Age of Onset    Other Father         SICKLE CELL AMENIA    Asthma Brother     Mental Illness Paternal Grandmother         SCHIZOPHRENIA        Allergies:  Patient has no known allergies. Home Medications:  Prior to Admission medications    Medication Sig Start Date End Date Taking? Authorizing Provider   amoxicillin-clavulanate (AUGMENTIN) 875-125 MG per tablet Take 1 tablet by mouth 2 times daily for 10 days 10/11/22 10/21/22 Yes Chanell Ayoub MD   acetaminophen (TYLENOL) 500 MG tablet Take 2 tablets by mouth every 6 hours as needed for Pain 10/11/22  Yes Chanell Ayoub MD   ibuprofen (ADVIL;MOTRIN) 800 MG tablet Take 1 tablet by mouth every 8 hours as needed for Pain 10/11/22  Yes Chanell Ayoub MD   amLODIPine (NORVASC) 5 MG tablet Take 1 tablet by mouth daily 3/15/18   Dallas Choudhary MD   diclofenac sodium 1 % GEL Apply 2 g topically 2 times daily 12/5/17   Jacquie Jarrell MD       REVIEW OFSYSTEMS    (2-9 systems for level 4, 10 or more for level 5)      Review of Systems   Constitutional:  Negative for activity change. Respiratory:  Negative for shortness of breath. Cardiovascular:  Negative for chest pain. Musculoskeletal:  Positive for arthralgias and myalgias. Skin:  Positive for wound. Neurological:  Negative for weakness and numbness. Hematological:  Does not bruise/bleed easily.      PHYSICAL EXAM   (up to 7 for level 4, 8 or more forlevel 5)      INITIAL VITALS:   ED Triage Vitals [10/11/22 1850]   BP Temp Temp Source Heart Rate Resp SpO2 Height Weight   (!) 173/93 98.3 °F (36.8 °C) Oral 97 18 99 % 6' 1\" (1.854 m) (!) 302 lb (137 kg)       Physical Exam  Vitals reviewed. Constitutional:       Appearance: Normal appearance. HENT:      Head: Normocephalic and atraumatic. Right Ear: External ear normal.      Left Ear: External ear normal.      Nose: Nose normal. No congestion. Mouth/Throat:      Mouth: Mucous membranes are moist.      Pharynx: Oropharynx is clear. Cardiovascular:      Rate and Rhythm: Normal rate and regular rhythm. Pulses: Normal pulses. Heart sounds: Normal heart sounds. Skin:     Capillary Refill: Capillary refill takes less than 2 seconds. Comments: 1 cm open laceration overlying the right fifth metacarpal shaft and smaller puncture wound distal to it. No active bleeding. Some swelling and erythema is present. No ROM deficits. Neurovascularly intact. Neurological:      Mental Status: He is alert. Sensory: No sensory deficit. Motor: No weakness.        DIFFERENTIAL  DIAGNOSIS     PLAN (LABS / IMAGING / EKG):  Orders Placed This Encounter   Procedures    XR HAND RIGHT (MIN 3 VIEWS)       MEDICATIONS ORDERED:  Orders Placed This Encounter   Medications    acetaminophen (TYLENOL) tablet 1,000 mg    amoxicillin-clavulanate (AUGMENTIN) 875-125 MG per tablet 1 tablet     Order Specific Question:   Antimicrobial Indications     Answer:   Skin and Soft Tissue Infection    amoxicillin-clavulanate (AUGMENTIN) 875-125 MG per tablet     Sig: Take 1 tablet by mouth 2 times daily for 10 days     Dispense:  20 tablet     Refill:  0    acetaminophen (TYLENOL) 500 MG tablet     Sig: Take 2 tablets by mouth every 6 hours as needed for Pain     Dispense:  30 tablet     Refill:  0    ibuprofen (ADVIL;MOTRIN) 800 MG tablet     Sig: Take 1 tablet by mouth every 8 hours as needed for Pain     Dispense:  20 tablet     Refill:  0       DDX: Dog bite    Initial MDM/Plan: 32 y.o. male who presents with dog bite of right hand. Tetanus is up-to-date. Will obtain x-ray to evaluate for fracture, dislocation, or foreign body. Will start on Augmentin. Discussed rabies vaccination with patient, given stray and uncolored dogs. Patient states that the dogs are frequently in the neighborhood he would prefer to watch their behavior and will return if there are any changes. DIAGNOSTIC RESULTS / EMERGENCYDEPARTMENT COURSE / MDM     LABS:  Labs Reviewed - No data to display      RADIOLOGY:  EXAMINATION:   THREE XRAY VIEWS OF THE RIGHT HAND       10/11/2022 8:03 pm       COMPARISON:   09/13/2019. HISTORY:   ORDERING SYSTEM PROVIDED HISTORY: Dog bite right hand   TECHNOLOGIST PROVIDED HISTORY:   Dog bite right hand   Reason for Exam: dog bite       FINDINGS:   The bone mineralization is within normal limits. There has been prior ORIF   5th metacarpal fracture. There is soft tissue swelling with linear gas   within the dorsum of the hand adjacent to the 5th metacarpal.  There is a   linear density within the soft tissues of the 2nd distal phalanx. Impression   1. No acute osseous abnormality involving the right hand. 2. Soft tissue swelling with linear foci of gas within the dorsum of the hand   adjacent to the 5th metacarpal.  This likely is secondary to the dog bite. 3. Linear foreign body adjacent to the 2nd distal phalanx. EMERGENCY DEPARTMENT COURSE:      Fracture dislocation of the area of the bite. No retained foreign bodies in the area. Will discharge home with course of Augmentin. Discussed return precautions. Patient acknowledged understanding and intent to comply. PROCEDURES:  None    CONSULTS:  None    CRITICAL CARE:  Please see attending note    FINAL IMPRESSION      1.  Dog bite of right hand, initial encounter          DISPOSITION / PLAN     DISPOSITION Decision To Discharge 10/11/2022 09:04:52 PM      PATIENT REFERRED TO:  [facility] ED  [address]  [phone]    If symptoms worsen    Gabby Bolanos MD  [address]  [address] [facility]  [phone]    In 3 days      Diogenes Valero MD  [address]   [address]  [place] [facility] [address]  [phone]    In 3 days      DISCHARGE MEDICATIONS:  Discharge Medication List as of 10/11/2022  9:05 PM        START taking these medications    Details   amoxicillin-clavulanate (AUGMENTIN) 875-125 MG per tablet Take 1 tablet by mouth 2 times daily for 10 days, Disp-20 tablet, R-0Print             Rojas Quezada MD  Emergency Medicine Resident    (Please note that portions of this note were completed with a voice recognition program.Efforts were made to edit the dictations but occasionally words are mis-transcribed.)       Rojas Quezada MD  Resident  10/13/22 [id number]

## 2023-02-12 ENCOUNTER — HOSPITAL ENCOUNTER (EMERGENCY)
Age: 33
Discharge: HOME | End: 2023-02-12
Attending: EMERGENCY MEDICINE

## 2023-02-12 ENCOUNTER — APPOINTMENT (OUTPATIENT)
Dept: GENERAL RADIOLOGY | Age: 33
End: 2023-02-12

## 2023-02-12 VITALS
HEART RATE: 83 BPM | HEIGHT: 73 IN | OXYGEN SATURATION: 99 % | WEIGHT: 295 LBS | DIASTOLIC BLOOD PRESSURE: 83 MMHG | BODY MASS INDEX: 39.1 KG/M2 | RESPIRATION RATE: 18 BRPM | TEMPERATURE: 97 F | SYSTOLIC BLOOD PRESSURE: 149 MMHG

## 2023-02-12 DIAGNOSIS — S46.001A INJURY OF RIGHT ROTATOR CUFF, INITIAL ENCOUNTER: Primary | ICD-10-CM

## 2023-02-12 PROCEDURE — 73030 X-RAY EXAM OF SHOULDER: CPT

## 2023-02-12 PROCEDURE — 99283 EMERGENCY DEPT VISIT LOW MDM: CPT

## 2023-02-12 RX ORDER — IBUPROFEN 600 MG/1
600 TABLET ORAL 4 TIMES DAILY PRN
Qty: 40 TABLET | Refills: 0 | Status: SHIPPED | OUTPATIENT
Start: 2023-02-12 | End: 2023-03-04

## 2023-02-12 RX ORDER — IBUPROFEN 400 MG/1
600 TABLET ORAL ONCE
Status: DISCONTINUED | OUTPATIENT
Start: 2023-02-12 | End: 2023-02-13 | Stop reason: HOSPADM

## 2023-02-12 RX ORDER — LIDOCAINE 4 G/G
1 PATCH TOPICAL DAILY
Qty: 20 EACH | Refills: 0 | Status: SHIPPED | OUTPATIENT
Start: 2023-02-12 | End: 2023-03-04

## 2023-02-12 RX ORDER — CYCLOBENZAPRINE HCL 10 MG
10 TABLET ORAL 3 TIMES DAILY PRN
Qty: 21 TABLET | Refills: 0 | Status: SHIPPED | OUTPATIENT
Start: 2023-02-12 | End: 2023-02-22

## 2023-02-12 ASSESSMENT — PAIN SCALES - GENERAL: PAINLEVEL_OUTOF10: 6

## 2023-02-12 ASSESSMENT — PAIN DESCRIPTION - ORIENTATION: ORIENTATION: RIGHT

## 2023-02-12 ASSESSMENT — PAIN DESCRIPTION - DESCRIPTORS: DESCRIPTORS: ACHING

## 2023-02-12 ASSESSMENT — PAIN DESCRIPTION - LOCATION: LOCATION: SHOULDER

## 2023-02-12 ASSESSMENT — PAIN - FUNCTIONAL ASSESSMENT: PAIN_FUNCTIONAL_ASSESSMENT: 0-10

## 2023-02-12 NOTE — Clinical Note
Madelaine Miramontes was seen and treated in our emergency department on 2/12/2023. He may return to work on 02/14/2023. If you have any questions or concerns, please don't hesitate to call.       Jose L Foy, DO

## 2023-02-13 ASSESSMENT — ENCOUNTER SYMPTOMS: BACK PAIN: 0

## 2023-02-13 NOTE — ED PROVIDER NOTES
[facility] [address] [facility]     Emergency Department     Faculty Attestation        I performed a history and physical examination of the patient and discussed management with the resident. I reviewed the residents note and agree with the documented findings and plan of care. Any areas of disagreement are noted on the chart. I was personally present for the key portions of any procedures. I have documented in the chart those procedures where I was not present during the key portions. I have reviewed the emergency nurses triage note. I agree with the chief complaint, past medical history, past surgical history, allergies, medications, social and family history as documented unless otherwise noted below. For Physician Assistant/ Nurse Practitioner cases/documentation I have personally evaluated this patient and have completed at least one if not all key elements of the E/M (history, physical exam, and MDM). Additional findings are as noted. Vital Signs: BP: (!) 149/83  Heart Rate: 83  Resp: 18  Temp: 97 °F (36.1 °C) SpO2: 99 %  PCP:  Gabriel Sabillon MD    Pertinent Comments:         Critical Care  None      (Please note that portions of this note were completed with a voice recognition program. Efforts were made to edit the dictations but occasionally words are mis-transcribed.  Whenever words are used in this note in any gender, they shall be construed as though they were used in the gender appropriate to the circumstances; and whenever words are used in this note in the singular or plural form, they shall be construed as though they were used in the form appropriate to the circumstances.)    MD Oxana [facility]  Attending Emergency Medicine Physician            Mario Sheriff MD  02/12/23 [id number]

## 2023-02-13 NOTE — ED PROVIDER NOTES
[facility] ED  Emergency Department Encounter  Emergency Medicine Resident     Pt Raya Angelo II  MRN: [id number]  [place] 1990  Date of evaluation: 2/12/23  PCP:  None None  Note Started: 11:00 PM EST      CHIEF COMPLAINT       No chief complaint on file. Right shoulder pain    HISTORY OF PRESENT ILLNESS  (Location/Symptom, Timing/Onset, Context/Setting, Quality, Duration, Modifying Factors, Severity.)      Dima Mcgowan II is a 28 y.o. male who presents with right shoulder pain that began about 2 weeks ago. Patient states he works at [employer] and is frequently lifting heavy equipment at work. About 2 weeks ago he was lifting and felt he may have injured his right shoulder. Since that time the patient has had increasing pain with range of motion of his right shoulder, intermittent tingling down his right arm. Denies history of similar pain. Has not taken medication at home for the pain. He states he has not injured his arm since then. Patient denies increased weakness, fevers, chills, swelling, bruising. PAST MEDICAL / SURGICAL / SOCIAL / FAMILY HISTORY      has a past medical history of Dental bridge present, Difficult intravenous access, Hand fracture, right, PONV (postoperative nausea and vomiting), and Wears glasses. has a past surgical history that includes fracture surgery (Right, 06/30/2015); orthopedic surgery (Right, 12/3/15); and Wrist surgery (Right, 09/27/2016).       Social History     Socioeconomic History    Marital status: Single     Spouse name: Not on file    Number of children: Not on file    Years of education: Not on file    Highest education level: Not on file   Occupational History    Not on file   Tobacco Use    Smoking status: Every Day     Packs/day: 2.00     Years: 3.00     Pack years: 6.00     Types: Cigarettes    Smokeless tobacco: Never   Substance and Sexual Activity    Alcohol use: No     Comment: LAST USE 01/2016    Drug use: Yes     Types: Marijuana [place])     Comment: LAST USE FIRST WEEK OF AUGUST    Sexual activity: Not on file   Other Topics Concern    Not on file   Social History Narrative    Not on file     Social Determinants of Health     Financial Resource Strain: Not on file   Food Insecurity: Not on file   Transportation Needs: Not on file   Physical Activity: Not on file   Stress: Not on file   Social Connections: Not on file   Intimate Partner Violence: Not on file   Housing Stability: Not on file       Family History   Problem Relation Age of Onset    Other Father         SICKLE CELL AMENIA    Asthma Brother     Mental Illness Paternal Grandmother         SCHIZOPHRENIA       Allergies:  Patient has no known allergies. Home Medications:  Prior to Admission medications    Medication Sig Start Date End Date Taking? Authorizing Provider   ibuprofen (ADVIL;MOTRIN) 600 MG tablet Take 1 tablet by mouth 4 times daily as needed for Pain 2/12/23 3/4/23 Yes Donald Ferrara DO   cyclobenzaprine (FLEXERIL) 10 MG tablet Take 1 tablet by mouth 3 times daily as needed for Muscle spasms 2/12/23 2/22/23 Yes Donald Ferrara DO   lidocaine 4 % external patch Place 1 patch onto the skin daily for 20 days 2/12/23 3/4/23 Yes Donald Ferrara DO   acetaminophen (TYLENOL) 500 MG tablet Take 2 tablets by mouth every 6 hours as needed for Pain 10/11/22   Herrera Sosa MD   amLODIPine (NORVASC) 5 MG tablet Take 1 tablet by mouth daily 3/15/18   Andrew Begum MD   diclofenac sodium 1 % GEL Apply 2 g topically 2 times daily 12/5/17   Didi Jovel MD         REVIEW OF SYSTEMS       Review of Systems   Constitutional:  Negative for chills and fever. Cardiovascular:  Negative for chest pain. Musculoskeletal:  Positive for arthralgias. Negative for back pain and neck pain. Skin:  Negative for wound.      PHYSICAL EXAM      INITIAL VITALS:   BP (!) 149/83   Pulse 83   Temp 97 °F (36.1 °C) (Oral)   Resp 18   Ht 6' 1\" (1.854 m)   Wt 295 lb (133.8 kg)   SpO2 99%   BMI 38.92 kg/m²     Physical Exam  Vitals and nursing note reviewed. Constitutional:       General: He is not in acute distress. Appearance: Normal appearance. HENT:      Head: Normocephalic and atraumatic. Cardiovascular:      Rate and Rhythm: Normal rate. Pulses: Normal pulses. Pulmonary:      Effort: Pulmonary effort is normal. No respiratory distress. Musculoskeletal:      Comments: Right shoulder tenderness with ROM over pectoralis muscle and rotator cuff, no obvious swelling or bruising, sensation and strength intact distally, 2+ radial pulse, right hand with surgical scarring and baseline diminished  strength   Skin:     General: Skin is warm and dry. Findings: No bruising. Neurological:      Mental Status: He is alert. DDX/DIAGNOSTIC RESULTS / EMERGENCY DEPARTMENT COURSE / MDM     Medical Decision Making  80-year-old male with injury to right shoulder after lifting heavy objects at work. Patient denies fever, chills, weakness, numbness. Patient slightly hypertensive, vital signs otherwise within normal limits. Right shoulder with diminished range of motion secondary to pain in rotator cuff distribution. Painful Apley scratch test.  DDx: Fracture, dislocation, muscle tear, sprain, strain. Plan for imaging of the right shoulder as well as symptomatic control    Amount and/or Complexity of Data Reviewed  Radiology: ordered and independent interpretation performed. Decision-making details documented in ED Course. Discussion of management or test interpretation with external provider(s): Patient given ibuprofen for pain in the emergency department. Shoulder without bony abnormality    Risk  OTC drugs. Prescription drug management. Risk Details: Discussed discharge home with symptomatic control including muscle relaxers, lidocaine patches, ibuprofen.   Patient comfortable with discharge to home with follow-up outpatient orthopedic clinic for further evaluation of likely rotator cuff pathology. Discussed return precautions patient expressed understanding          EMERGENCY DEPARTMENT COURSE:      ED Course as of 02/13/23 1720   Sun Feb 12, 2023   2338 Shoulder xr without acute bony abnormality [TD]      ED Course User Index  [TD] Bean Hernandez DO           CONSULTS:  None      FINAL IMPRESSION      1.  Injury of right rotator cuff, initial encounter          DISPOSITION / PLAN     DISPOSITION Decision To Discharge 02/12/2023 11:39:41 PM      PATIENT REFERRED TO:  [address] [place], [place] [id number]  [phone]  Schedule an appointment as soon as possible for a visit   for evaluation of right shoulder pain, possible rotator cuff injury    DISCHARGE MEDICATIONS:  Discharge Medication List as of 2/12/2023 11:44 PM        START taking these medications    Details   cyclobenzaprine (FLEXERIL) 10 MG tablet Take 1 tablet by mouth 3 times daily as needed for Muscle spasms, Disp-21 tablet, R-0Print      lidocaine 4 % external patch Place 1 patch onto the skin daily for 20 days, TransDERmal, DAILY Starting Sun 2/12/2023, Until Sat 3/4/2023, For 20 days, Disp-20 each, R-0, Print             Bean Hernandez DO  Emergency Medicine Resident    (Please note that portions of thisnote were completed with a voice recognition program.  Efforts were made to edit the dictations but occasionally words are mis-transcribed.)      Bean Hernandez DO  Resident  02/13/23 1723

## 2023-02-13 NOTE — ED NOTES
Pt came to ed via triage w complaint of r shoulder pain. States pain has been present for a week and has affected his IDLs. No medications taken PTA, pt does not like pain medication. Pt denies chest pain, denies sob. PT has hx of HTN, however stopped taking medication for it. No cardiac hx that pt is aware of. VSS, NAD, AOx4.  Pt resting in bed with call light in reach no verbalized needs at this time        Selam Palacios RN  02/12/23 [id number]

## 2023-02-13 NOTE — DISCHARGE INSTRUCTIONS
You were seen for evaluation of right shoulder pain. Your x-ray did not show any acute bony abnormality including fracture or dislocation. Your injury is likely a rotator cuff injury. You will be given information for an orthopedics clinic to follow-up with outpatient. You should call and make an appointment to discuss further testing for evaluation of this injury. You were given ibuprofen in the emergency department for pain. You will be given ibuprofen to go home with as well as a few days of a muscle relaxer to take as needed and a lidocaine patch to apply for pain. You should return the emergency department for any worsening pain, increased numbness or tingling, weakness, chest pain, shortness of breath, other new or concerning symptoms.   Otherwise follow-up outpatient to reevaluate your shoulder